# Patient Record
Sex: MALE | Race: WHITE | NOT HISPANIC OR LATINO
[De-identification: names, ages, dates, MRNs, and addresses within clinical notes are randomized per-mention and may not be internally consistent; named-entity substitution may affect disease eponyms.]

---

## 2018-11-21 PROBLEM — Z00.00 ENCOUNTER FOR PREVENTIVE HEALTH EXAMINATION: Status: ACTIVE | Noted: 2018-11-21

## 2018-12-06 ENCOUNTER — APPOINTMENT (OUTPATIENT)
Dept: HEART AND VASCULAR | Facility: CLINIC | Age: 63
End: 2018-12-06

## 2018-12-13 ENCOUNTER — APPOINTMENT (OUTPATIENT)
Dept: HEART AND VASCULAR | Facility: CLINIC | Age: 63
End: 2018-12-13

## 2018-12-26 ENCOUNTER — APPOINTMENT (OUTPATIENT)
Dept: HEART AND VASCULAR | Facility: CLINIC | Age: 63
End: 2018-12-26

## 2021-12-22 ENCOUNTER — EMERGENCY (EMERGENCY)
Facility: HOSPITAL | Age: 66
LOS: 0 days | Discharge: HOME | End: 2021-12-22
Attending: EMERGENCY MEDICINE | Admitting: EMERGENCY MEDICINE
Payer: MEDICARE

## 2021-12-22 VITALS
OXYGEN SATURATION: 99 % | SYSTOLIC BLOOD PRESSURE: 139 MMHG | HEART RATE: 66 BPM | TEMPERATURE: 98 F | DIASTOLIC BLOOD PRESSURE: 67 MMHG | RESPIRATION RATE: 18 BRPM

## 2021-12-22 DIAGNOSIS — R05.1 ACUTE COUGH: ICD-10-CM

## 2021-12-22 DIAGNOSIS — R09.81 NASAL CONGESTION: ICD-10-CM

## 2021-12-22 DIAGNOSIS — I10 ESSENTIAL (PRIMARY) HYPERTENSION: ICD-10-CM

## 2021-12-22 DIAGNOSIS — U07.1 COVID-19: ICD-10-CM

## 2021-12-22 DIAGNOSIS — R68.83 CHILLS (WITHOUT FEVER): ICD-10-CM

## 2021-12-22 LAB
FLUAV AG NPH QL: NEGATIVE — SIGNIFICANT CHANGE UP
FLUBV AG NPH QL: NEGATIVE — SIGNIFICANT CHANGE UP
RSV RNA NPH QL NAA+NON-PROBE: NEGATIVE — SIGNIFICANT CHANGE UP
SARS-COV-2 RNA SPEC QL NAA+PROBE: POSITIVE

## 2021-12-22 PROCEDURE — 99284 EMERGENCY DEPT VISIT MOD MDM: CPT

## 2021-12-22 NOTE — ED PROVIDER NOTE - NS ED MD DISPO DISCHARGE
Scheduled with Sirena tomorrow.     All other review of systems negative, except as noted in HPI Home

## 2021-12-22 NOTE — ED PROVIDER NOTE - PATIENT PORTAL LINK FT
You can access the FollowMyHealth Patient Portal offered by [facility] by registering at the following website: [email or web address]. By joining [facility]’s FollowMyHealth portal, you will also be able to view your health information using other applications (apps) compatible with our system.

## 2021-12-22 NOTE — ED PROVIDER NOTE - PHYSICAL EXAMINATION
Vital Signs: I have reviewed the initial vital signs.  Constitutional: well-nourished, appears stated age, no acute distress  Head: atraumatic and normocephalic  Eyes:PERRLA, EOMI, no nystagmus, clear conjunctiva  ENT: TM b/l clear, oropharynx clear, no dental injury, MMM  Cardiovascular: regular rate, regular rhythm, well-perfused extremities  Respiratory: unlabored respiratory effort, clear to auscultation bilaterally  Gastrointestinal: soft, non-tender abdomen, no pulsatile mass  Neuro: awake, alert, follows commands, oriented, no focal deficits, GCS 15  ;

## 2021-12-22 NOTE — ED ADULT NURSE NOTE - PRIMARY CARE PROVIDER
pmd Doxycycline Counseling:  Patient counseled regarding possible photosensitivity and increased risk for sunburn.  Patient instructed to avoid sunlight, if possible.  When exposed to sunlight, patients should wear protective clothing, sunglasses, and sunscreen.  The patient was instructed to call the office immediately if the following severe adverse effects occur:  hearing changes, easy bruising/bleeding, severe headache, or vision changes.  The patient verbalized understanding of the proper use and possible adverse effects of doxycycline.  All of the patient's questions and concerns were addressed.

## 2021-12-22 NOTE — ED ADULT NURSE NOTE - NSIMPLEMENTINTERV_GEN_ALL_ED
Implemented All Universal Safety Interventions:  [place] to call system. Call bell, personal items and telephone within reach. Instruct patient to call for assistance. Room bathroom lighting operational. Non-slip footwear when patient is off stretcher. Physically safe environment: no spills, clutter or unnecessary equipment. Stretcher in lowest position, wheels locked, appropriate side rails in place.

## 2021-12-22 NOTE — ED PROVIDER NOTE - OBJECTIVE STATEMENT
67 y/o male presents to the ED with chills, non productive cough and sore throat x 2 days. patient covid vaccinated not boostered. patient attended a large wedding over the weekend. patient denies any vomiting or diarrhea. no dysuria or gross hematuria. no myalgias

## 2021-12-22 NOTE — ED PROVIDER NOTE - CLINICAL SUMMARY MEDICAL DECISION MAKING FREE TEXT BOX
pt presenting with URI sx x2d. no cp, sob. Well appearing, NAD, non toxic. NCAT PERRLA EOMI neck supple non tender normal wob cta bl rrr abdomen s nt nd no rebound no guarding WWPx4 neuro non focal. Comfortable with discharge and follow-up outpatient, strict return precautions given. Endorses understanding of all of this and aware that they can return at any time for new or concerning symptoms. No further questions or concerns at this time

## 2021-12-22 NOTE — ED PROVIDER NOTE - NS ED ROS FT
Review of Systems    Constitutional: (-) fever or chills  respiratory: (-) cough (-) shortness of breath  Cardiovascular: (-) syncope, palpitations or chest pain  GI: (-) no abdominal pain, vomiting or diarrhea  Integumentary: (-) rash or painful lymph nodes  msk: no joint pain or painful ROM  ent: sore throat  Neurological: (-) headache or head injury

## 2023-02-23 ENCOUNTER — APPOINTMENT (OUTPATIENT)
Dept: ELECTROPHYSIOLOGY | Facility: CLINIC | Age: 68
End: 2023-02-23

## 2023-03-09 ENCOUNTER — APPOINTMENT (OUTPATIENT)
Dept: ELECTROPHYSIOLOGY | Facility: CLINIC | Age: 68
End: 2023-03-09

## 2023-06-09 ENCOUNTER — INPATIENT (INPATIENT)
Facility: HOSPITAL | Age: 68
LOS: 0 days | Discharge: HOME | DRG: 309 | End: 2023-06-09
Attending: INTERNAL MEDICINE | Admitting: HOSPITALIST
Payer: MEDICARE

## 2023-06-09 VITALS
DIASTOLIC BLOOD PRESSURE: 86 MMHG | SYSTOLIC BLOOD PRESSURE: 157 MMHG | RESPIRATION RATE: 18 BRPM | TEMPERATURE: 98 F | OXYGEN SATURATION: 96 % | HEART RATE: 41 BPM

## 2023-06-09 VITALS
HEART RATE: 45 BPM | SYSTOLIC BLOOD PRESSURE: 203 MMHG | RESPIRATION RATE: 19 BRPM | DIASTOLIC BLOOD PRESSURE: 97 MMHG | WEIGHT: 205.03 LBS | TEMPERATURE: 98 F | OXYGEN SATURATION: 96 %

## 2023-06-09 DIAGNOSIS — R00.1 BRADYCARDIA, UNSPECIFIED: ICD-10-CM

## 2023-06-09 LAB
ALBUMIN SERPL ELPH-MCNC: 4.6 G/DL — SIGNIFICANT CHANGE UP (ref 3.5–5.2)
ALP SERPL-CCNC: 62 U/L — SIGNIFICANT CHANGE UP (ref 30–115)
ALT FLD-CCNC: 30 U/L — SIGNIFICANT CHANGE UP (ref 0–41)
ANION GAP SERPL CALC-SCNC: 10 MMOL/L — SIGNIFICANT CHANGE UP (ref 7–14)
AST SERPL-CCNC: 23 U/L — SIGNIFICANT CHANGE UP (ref 0–41)
BASOPHILS # BLD AUTO: 0.07 K/UL — SIGNIFICANT CHANGE UP (ref 0–0.2)
BASOPHILS NFR BLD AUTO: 0.8 % — SIGNIFICANT CHANGE UP (ref 0–1)
BILIRUB SERPL-MCNC: 0.3 MG/DL — SIGNIFICANT CHANGE UP (ref 0.2–1.2)
BUN SERPL-MCNC: 18 MG/DL — SIGNIFICANT CHANGE UP (ref 10–20)
CALCIUM SERPL-MCNC: 9.7 MG/DL — SIGNIFICANT CHANGE UP (ref 8.4–10.5)
CHLORIDE SERPL-SCNC: 99 MMOL/L — SIGNIFICANT CHANGE UP (ref 98–110)
CO2 SERPL-SCNC: 29 MMOL/L — SIGNIFICANT CHANGE UP (ref 17–32)
CREAT SERPL-MCNC: 0.8 MG/DL — SIGNIFICANT CHANGE UP (ref 0.7–1.5)
EGFR: 96 ML/MIN/1.73M2 — SIGNIFICANT CHANGE UP
EOSINOPHIL # BLD AUTO: 0.68 K/UL — SIGNIFICANT CHANGE UP (ref 0–0.7)
EOSINOPHIL NFR BLD AUTO: 8.2 % — HIGH (ref 0–8)
GLUCOSE BLDC GLUCOMTR-MCNC: 122 MG/DL — HIGH (ref 70–99)
GLUCOSE BLDC GLUCOMTR-MCNC: 141 MG/DL — HIGH (ref 70–99)
GLUCOSE SERPL-MCNC: 158 MG/DL — HIGH (ref 70–99)
HCT VFR BLD CALC: 44 % — SIGNIFICANT CHANGE UP (ref 42–52)
HGB BLD-MCNC: 14.6 G/DL — SIGNIFICANT CHANGE UP (ref 14–18)
IMM GRANULOCYTES NFR BLD AUTO: 0.2 % — SIGNIFICANT CHANGE UP (ref 0.1–0.3)
LACTATE SERPL-SCNC: 1.2 MMOL/L — SIGNIFICANT CHANGE UP (ref 0.7–2)
LYMPHOCYTES # BLD AUTO: 2.19 K/UL — SIGNIFICANT CHANGE UP (ref 1.2–3.4)
LYMPHOCYTES # BLD AUTO: 26.5 % — SIGNIFICANT CHANGE UP (ref 20.5–51.1)
MCHC RBC-ENTMCNC: 29.2 PG — SIGNIFICANT CHANGE UP (ref 27–31)
MCHC RBC-ENTMCNC: 33.2 G/DL — SIGNIFICANT CHANGE UP (ref 32–37)
MCV RBC AUTO: 88 FL — SIGNIFICANT CHANGE UP (ref 80–94)
MONOCYTES # BLD AUTO: 1.06 K/UL — HIGH (ref 0.1–0.6)
MONOCYTES NFR BLD AUTO: 12.8 % — HIGH (ref 1.7–9.3)
NEUTROPHILS # BLD AUTO: 4.25 K/UL — SIGNIFICANT CHANGE UP (ref 1.4–6.5)
NEUTROPHILS NFR BLD AUTO: 51.5 % — SIGNIFICANT CHANGE UP (ref 42.2–75.2)
NRBC # BLD: 0 /100 WBCS — SIGNIFICANT CHANGE UP (ref 0–0)
NT-PROBNP SERPL-SCNC: 360 PG/ML — HIGH (ref 0–300)
PLATELET # BLD AUTO: 221 K/UL — SIGNIFICANT CHANGE UP (ref 130–400)
PMV BLD: 10.5 FL — HIGH (ref 7.4–10.4)
POTASSIUM SERPL-MCNC: 4.9 MMOL/L — SIGNIFICANT CHANGE UP (ref 3.5–5)
POTASSIUM SERPL-SCNC: 4.9 MMOL/L — SIGNIFICANT CHANGE UP (ref 3.5–5)
PROT SERPL-MCNC: 7.7 G/DL — SIGNIFICANT CHANGE UP (ref 6–8)
RBC # BLD: 5 M/UL — SIGNIFICANT CHANGE UP (ref 4.7–6.1)
RBC # FLD: 13.7 % — SIGNIFICANT CHANGE UP (ref 11.5–14.5)
SODIUM SERPL-SCNC: 138 MMOL/L — SIGNIFICANT CHANGE UP (ref 135–146)
TROPONIN T SERPL-MCNC: <0.01 NG/ML — SIGNIFICANT CHANGE UP
TROPONIN T SERPL-MCNC: <0.01 NG/ML — SIGNIFICANT CHANGE UP
TSH SERPL-MCNC: 3.24 UIU/ML — SIGNIFICANT CHANGE UP (ref 0.27–4.2)
WBC # BLD: 8.27 K/UL — SIGNIFICANT CHANGE UP (ref 4.8–10.8)
WBC # FLD AUTO: 8.27 K/UL — SIGNIFICANT CHANGE UP (ref 4.8–10.8)

## 2023-06-09 PROCEDURE — 99223 1ST HOSP IP/OBS HIGH 75: CPT

## 2023-06-09 PROCEDURE — 93306 TTE W/DOPPLER COMPLETE: CPT | Mod: 26

## 2023-06-09 PROCEDURE — 84443 ASSAY THYROID STIM HORMONE: CPT

## 2023-06-09 PROCEDURE — 71045 X-RAY EXAM CHEST 1 VIEW: CPT | Mod: 26

## 2023-06-09 PROCEDURE — 82962 GLUCOSE BLOOD TEST: CPT

## 2023-06-09 PROCEDURE — 36415 COLL VENOUS BLD VENIPUNCTURE: CPT

## 2023-06-09 PROCEDURE — 83605 ASSAY OF LACTIC ACID: CPT

## 2023-06-09 PROCEDURE — 93306 TTE W/DOPPLER COMPLETE: CPT

## 2023-06-09 PROCEDURE — 93010 ELECTROCARDIOGRAM REPORT: CPT | Mod: 77

## 2023-06-09 PROCEDURE — 99222 1ST HOSP IP/OBS MODERATE 55: CPT

## 2023-06-09 PROCEDURE — 84484 ASSAY OF TROPONIN QUANT: CPT

## 2023-06-09 PROCEDURE — 99285 EMERGENCY DEPT VISIT HI MDM: CPT

## 2023-06-09 PROCEDURE — 93010 ELECTROCARDIOGRAM REPORT: CPT

## 2023-06-09 RX ORDER — SODIUM CHLORIDE 9 MG/ML
1000 INJECTION, SOLUTION INTRAVENOUS
Refills: 0 | Status: DISCONTINUED | OUTPATIENT
Start: 2023-06-09 | End: 2023-06-09

## 2023-06-09 RX ORDER — AMLODIPINE BESYLATE 2.5 MG/1
10 TABLET ORAL DAILY
Refills: 0 | Status: DISCONTINUED | OUTPATIENT
Start: 2023-06-09 | End: 2023-06-09

## 2023-06-09 RX ORDER — RANOLAZINE 500 MG/1
500 TABLET, FILM COATED, EXTENDED RELEASE ORAL
Refills: 0 | Status: DISCONTINUED | OUTPATIENT
Start: 2023-06-09 | End: 2023-06-09

## 2023-06-09 RX ORDER — DEXTROSE 50 % IN WATER 50 %
25 SYRINGE (ML) INTRAVENOUS ONCE
Refills: 0 | Status: DISCONTINUED | OUTPATIENT
Start: 2023-06-09 | End: 2023-06-09

## 2023-06-09 RX ORDER — INSULIN LISPRO 100/ML
VIAL (ML) SUBCUTANEOUS
Refills: 0 | Status: DISCONTINUED | OUTPATIENT
Start: 2023-06-09 | End: 2023-06-09

## 2023-06-09 RX ORDER — ATORVASTATIN CALCIUM 80 MG/1
80 TABLET, FILM COATED ORAL AT BEDTIME
Refills: 0 | Status: DISCONTINUED | OUTPATIENT
Start: 2023-06-09 | End: 2023-06-09

## 2023-06-09 RX ORDER — ASPIRIN/CALCIUM CARB/MAGNESIUM 324 MG
81 TABLET ORAL DAILY
Refills: 0 | Status: DISCONTINUED | OUTPATIENT
Start: 2023-06-09 | End: 2023-06-09

## 2023-06-09 RX ORDER — ACETAMINOPHEN 500 MG
650 TABLET ORAL EVERY 6 HOURS
Refills: 0 | Status: DISCONTINUED | OUTPATIENT
Start: 2023-06-09 | End: 2023-06-09

## 2023-06-09 RX ORDER — LANOLIN ALCOHOL/MO/W.PET/CERES
3 CREAM (GRAM) TOPICAL AT BEDTIME
Refills: 0 | Status: DISCONTINUED | OUTPATIENT
Start: 2023-06-09 | End: 2023-06-09

## 2023-06-09 RX ORDER — LOSARTAN POTASSIUM 100 MG/1
100 TABLET, FILM COATED ORAL DAILY
Refills: 0 | Status: DISCONTINUED | OUTPATIENT
Start: 2023-06-09 | End: 2023-06-09

## 2023-06-09 RX ORDER — ATROPINE SULFATE 0.1 MG/ML
1 SYRINGE (ML) INJECTION ONCE
Refills: 0 | Status: DISCONTINUED | OUTPATIENT
Start: 2023-06-09 | End: 2023-06-09

## 2023-06-09 RX ORDER — DEXTROSE 50 % IN WATER 50 %
15 SYRINGE (ML) INTRAVENOUS ONCE
Refills: 0 | Status: DISCONTINUED | OUTPATIENT
Start: 2023-06-09 | End: 2023-06-09

## 2023-06-09 RX ORDER — DEXTROSE 50 % IN WATER 50 %
12.5 SYRINGE (ML) INTRAVENOUS ONCE
Refills: 0 | Status: DISCONTINUED | OUTPATIENT
Start: 2023-06-09 | End: 2023-06-09

## 2023-06-09 RX ORDER — ONDANSETRON 8 MG/1
4 TABLET, FILM COATED ORAL EVERY 8 HOURS
Refills: 0 | Status: DISCONTINUED | OUTPATIENT
Start: 2023-06-09 | End: 2023-06-09

## 2023-06-09 RX ORDER — ENOXAPARIN SODIUM 100 MG/ML
40 INJECTION SUBCUTANEOUS EVERY 24 HOURS
Refills: 0 | Status: DISCONTINUED | OUTPATIENT
Start: 2023-06-09 | End: 2023-06-09

## 2023-06-09 RX ORDER — HYDRALAZINE HCL 50 MG
25 TABLET ORAL DAILY
Refills: 0 | Status: DISCONTINUED | OUTPATIENT
Start: 2023-06-09 | End: 2023-06-09

## 2023-06-09 RX ORDER — PANTOPRAZOLE SODIUM 20 MG/1
40 TABLET, DELAYED RELEASE ORAL
Refills: 0 | Status: DISCONTINUED | OUTPATIENT
Start: 2023-06-09 | End: 2023-06-09

## 2023-06-09 RX ORDER — ASPIRIN/CALCIUM CARB/MAGNESIUM 324 MG
324 TABLET ORAL ONCE
Refills: 0 | Status: COMPLETED | OUTPATIENT
Start: 2023-06-09 | End: 2023-06-09

## 2023-06-09 RX ORDER — GLUCAGON INJECTION, SOLUTION 0.5 MG/.1ML
1 INJECTION, SOLUTION SUBCUTANEOUS ONCE
Refills: 0 | Status: DISCONTINUED | OUTPATIENT
Start: 2023-06-09 | End: 2023-06-09

## 2023-06-09 RX ADMIN — Medication 81 MILLIGRAM(S): at 15:03

## 2023-06-09 RX ADMIN — Medication 324 MILLIGRAM(S): at 01:09

## 2023-06-09 RX ADMIN — ENOXAPARIN SODIUM 40 MILLIGRAM(S): 100 INJECTION SUBCUTANEOUS at 06:02

## 2023-06-09 RX ADMIN — PANTOPRAZOLE SODIUM 40 MILLIGRAM(S): 20 TABLET, DELAYED RELEASE ORAL at 06:02

## 2023-06-09 NOTE — CONSULT NOTE ADULT - SUBJECTIVE AND OBJECTIVE BOX
Outpt cardiologist:    Reason for Consult:     HISTORY OF PRESENT ILLNESS:  69 yo M with PMHx significant for HTN, HLD, DM, CAD s/p CABG (f/u with Dr. Ced Neff) presented for chest pain.  He felt overnight while working on the computer chest pain crushing that radiates to his L arm for a period of time of 1 hours approximately and decided to come to the ED. He had associated lightheadedness and BP at home 208 SBP which is not normal. He reports his chest pain is similar to when he had CABG but is lighter. He did not had any sweating, palpitations at time of event.  He does not have any interval chest pain, AVINA, palpitations - He denies any history of arrythmia or CHF  He f/up with Dr. Neff and had a recent echo done not long ago - is compliant with his medications (does no remeber exactly which one he takes)     Med rec incomplete - He doesnt have a med list and his pharmacy is [facility] - Last time he took his meds including metop succ 50 was yesterday 6:00 AM     ROS   Positive as above  Negative fever, chills, nausea, vomiting, abdominal pain, sob, cough, constipation, diarrhea, incontinence    ED Vital Signs Last 24 Hrs  T(C): 36.7 (09 Jun 2023 00:19), Max: 36.7 (09 Jun 2023 00:19)  T(F): 98.1 (09 Jun 2023 00:19), Max: 98.1 (09 Jun 2023 00:19)  HR: 30 (09 Jun 2023 03:19) (30 - 51)  BP: 166/77 (09 Jun 2023 03:19) (166/77 - 203/97)  RR: 18 (09 Jun 2023 03:19) (18 - 19)  SpO2: 96% (09 Jun 2023 03:19) (96% - 98%)  O2 Parameters below as of 09 Jun 2023 03:19  Patient On (Oxygen Delivery Method): room air    Labs significant for trops neg -    EKG Deep Q waves in inferior leads - TWI in III, avF - Atrial fibrillation with SVR   CXR mild pulmonary vascular congestion otherwise no acute cardiopulm abn   In the ED given aspirin 324   Cardiology fellow made aware after examining patient          (09 Jun 2023 03:44)      Cardiology Fellow Notes    Patient has CABG 20 years has been okay since follows with Dr. Neff   Yesterday had an episode of left sided chest pressure with left arm numbness that lasted 1h so he presented to the ED     In the ED he was initially very HTN and bradycardic   ECG showed RBBB with sinus bradycardia and 1st degree AV block     On my interview, the patient was asymtomatic, looked comfortable   HR was 20s 30s when asleep consistent with sinus bradycardia with blocker PACs     2 sets of troponin were negative         PAST MEDICAL & SURGICAL HISTORY  Mild HTN        FAMILY HISTORY:  FAMILY HISTORY:      SOCIAL HISTORY:  Social History:      ALLERGIES:  No Known Allergies      MEDICATIONS:  aspirin  chewable 81 milliGRAM(s) Oral daily  atorvastatin 80 milliGRAM(s) Oral at bedtime  dextrose 5%. 1000 milliLiter(s) (100 mL/Hr) IV Continuous <Continuous>  dextrose 5%. 1000 milliLiter(s) (50 mL/Hr) IV Continuous <Continuous>  dextrose 50% Injectable 12.5 Gram(s) IV Push once  dextrose 50% Injectable 25 Gram(s) IV Push once  dextrose 50% Injectable 25 Gram(s) IV Push once  enoxaparin Injectable 40 milliGRAM(s) SubCutaneous every 24 hours  glucagon  Injectable 1 milliGRAM(s) IntraMuscular once  insulin lispro (ADMELOG) corrective regimen sliding scale   SubCutaneous three times a day before meals  pantoprazole    Tablet 40 milliGRAM(s) Oral before breakfast    PRN:  acetaminophen     Tablet .. 650 milliGRAM(s) Oral every 6 hours PRN  aluminum hydroxide/magnesium hydroxide/simethicone Suspension 30 milliLiter(s) Oral every 4 hours PRN  atropine Injectable 1 milliGRAM(s) IntraMuscular once PRN  dextrose Oral Gel 15 Gram(s) Oral once PRN  melatonin 3 milliGRAM(s) Oral at bedtime PRN  ondansetron Injectable 4 milliGRAM(s) IV Push every 8 hours PRN      HOME MEDICATIONS:  Home Medications:      VITALS:   T(F): 98.1 (06-09 @ 00:19), Max: 98.1 (06-09 @ 00:19)  HR: 38 (06-09 @ 05:39) (30 - 51)  BP: 151/68 (06-09 @ 05:39) (151/68 - 203/97)  BP(mean): --  RR: 18 (06-09 @ 05:39) (18 - 19)  SpO2: 96% (06-09 @ 05:39) (96% - 98%)    I&O's Summary      REVIEW OF SYSTEMS:  CONSTITUTIONAL: No weakness, fevers or chills  HEENT: No visual changes, neck/ear pain  RESPIRATORY: No cough, sob  CARDIOVASCULAR: See HPI  GASTROINTESTINAL: No abdominal pain. No nausea, vomiting, diarrhea   GENITOURINARY: No dysuria, frequency or hematuria  NEUROLOGICAL: No new focal deficits  SKIN: No new rashes    PHYSICAL EXAM:  General: Not in distress.  Non-toxic appearing.   HEENT: EOMI  Cardio: regular, S1, S2, no murmur  Pulm: B/L BS.  No wheezing / crackles / rales  Abdomen: Soft, non-tender, non-distended. Normoactive bowel sounds  Extremities: No edema b/l le  Neuro: A&O x3. No focal deficits    LABS:                        14.6   8.27  )-----------( 221      ( 09 Jun 2023 00:45 )             44.0     06-09    138  |  99  |  18  ----------------------------<  158<H>  4.9   |  29  |  0.8    Ca    9.7      09 Jun 2023 00:45    TPro  7.7  /  Alb  4.6  /  TBili  0.3  /  DBili  x   /  AST  23  /  ALT  30  /  AlkPhos  62  06-09      Troponin T, Serum: <0.01 ng/mL (06-09-23 @ 04:40)  Lactate, Blood: 1.2 mmol/L (06-09-23 @ 04:40)  Troponin T, Serum: <0.01 ng/mL (06-09-23 @ 00:45)    CARDIAC MARKERS ( 09 Jun 2023 04:40 )  x     / <0.01 ng/mL / x     / x     / x      CARDIAC MARKERS ( 09 Jun 2023 00:45 )  x     / <0.01 ng/mL / x     / x     / x            Troponin trend:  ECG:    Sinus bradycardia with 1st degree av block and blocked PACs      Outpt cardiologist:    Reason for Consult:     HISTORY OF PRESENT ILLNESS:  69 yo M with PMHx significant for HTN, HLD, DM, CAD s/p CABG (f/u with Dr. Ced Neff) presented for chest pain.  He felt overnight while working on the computer chest pain crushing that radiates to his L arm for a period of time of 1 hours approximately and decided to come to the ED. He had associated lightheadedness and BP at home 208 SBP which is not normal. He reports his chest pain is similar to when he had CABG but is lighter. He did not had any sweating, palpitations at time of event.  He does not have any interval chest pain, AVINA, palpitations - He denies any history of arrythmia or CHF  He f/up with Dr. Neff and had a recent echo done not long ago - is compliant with his medications (does no remember exactly which one he takes)     Med rec incomplete - He doesnt have a med list and his pharmacy is [facility] - Last time he took his meds including metop succ 50 was yesterday 6:00 AM     ROS   Positive as above  Negative fever, chills, nausea, vomiting, abdominal pain, sob, cough, constipation, diarrhea, incontinence    ED Vital Signs Last 24 Hrs  T(C): 36.7 (09 Jun 2023 00:19), Max: 36.7 (09 Jun 2023 00:19)  T(F): 98.1 (09 Jun 2023 00:19), Max: 98.1 (09 Jun 2023 00:19)  HR: 30 (09 Jun 2023 03:19) (30 - 51)  BP: 166/77 (09 Jun 2023 03:19) (166/77 - 203/97)  RR: 18 (09 Jun 2023 03:19) (18 - 19)  SpO2: 96% (09 Jun 2023 03:19) (96% - 98%)  O2 Parameters below as of 09 Jun 2023 03:19  Patient On (Oxygen Delivery Method): room air    Labs significant for trops neg -    EKG Deep Q waves in inferior leads - TWI in III, avF - Atrial fibrillation with SVR   CXR mild pulmonary vascular congestion otherwise no acute cardiopulm abn   In the ED given aspirin 324   Cardiology fellow made aware after examining patient          (09 Jun 2023 03:44)      Cardiology Fellow Notes    Patient has CABG 20 years has been okay since follows with Dr. Neff   Yesterday had an episode of left sided chest pressure with left arm numbness that lasted 1h so he presented to the ED     In the ED he was initially very HTN and bradycardic   ECG showed RBBB with sinus bradycardia and 1st degree AV block     On my interview, the patient was asymtomatic, looked comfortable   HR was 20s 30s when asleep consistent with sinus bradycardia with blocked PACs     2 sets of troponin were negative         PAST MEDICAL & SURGICAL HISTORY  Mild HTN        FAMILY HISTORY:  FAMILY HISTORY: no family history of premature CAD or SCD      SOCIAL HISTORY:  Social History: no toxic habits      ALLERGIES:  No Known Allergies      MEDICATIONS:  aspirin  chewable 81 milliGRAM(s) Oral daily  atorvastatin 80 milliGRAM(s) Oral at bedtime  dextrose 5%. 1000 milliLiter(s) (100 mL/Hr) IV Continuous <Continuous>  dextrose 5%. 1000 milliLiter(s) (50 mL/Hr) IV Continuous <Continuous>  dextrose 50% Injectable 12.5 Gram(s) IV Push once  dextrose 50% Injectable 25 Gram(s) IV Push once  dextrose 50% Injectable 25 Gram(s) IV Push once  enoxaparin Injectable 40 milliGRAM(s) SubCutaneous every 24 hours  glucagon  Injectable 1 milliGRAM(s) IntraMuscular once  insulin lispro (ADMELOG) corrective regimen sliding scale   SubCutaneous three times a day before meals  pantoprazole    Tablet 40 milliGRAM(s) Oral before breakfast    PRN:  acetaminophen     Tablet .. 650 milliGRAM(s) Oral every 6 hours PRN  aluminum hydroxide/magnesium hydroxide/simethicone Suspension 30 milliLiter(s) Oral every 4 hours PRN  atropine Injectable 1 milliGRAM(s) IntraMuscular once PRN  dextrose Oral Gel 15 Gram(s) Oral once PRN  melatonin 3 milliGRAM(s) Oral at bedtime PRN  ondansetron Injectable 4 milliGRAM(s) IV Push every 8 hours PRN      HOME MEDICATIONS:  Home Medications:      VITALS:   T(F): 98.1 (06-09 @ 00:19), Max: 98.1 (06-09 @ 00:19)  HR: 38 (06-09 @ 05:39) (30 - 51)  BP: 151/68 (06-09 @ 05:39) (151/68 - 203/97)  BP(mean): --  RR: 18 (06-09 @ 05:39) (18 - 19)  SpO2: 96% (06-09 @ 05:39) (96% - 98%)    I&O's Summary      REVIEW OF SYSTEMS:  CONSTITUTIONAL: No weakness, fevers or chills  HEENT: No visual changes, neck/ear pain  RESPIRATORY: No cough, sob  CARDIOVASCULAR: See HPI  GASTROINTESTINAL: No abdominal pain. No nausea, vomiting, diarrhea   GENITOURINARY: No dysuria, frequency or hematuria  NEUROLOGICAL: No new focal deficits  SKIN: No new rashes  10 point ROS completed; negative except as stated in HPI    PHYSICAL EXAM:  General: Not in distress.  Non-toxic appearing.   HEENT: EOMI, PERRLA  Neck: supple, no JVD  Cardio: bradycardic, regular, S1, S2, no murmur  Pulm: B/L BS.  No wheezing / crackles / rales  Abdomen: Soft, non-tender, non-distended. Normoactive bowel sounds  Extremities: No edema b/l le  Neuro: A&O x3. No focal deficits    LABS:                        14.6   8.27  )-----------( 221      ( 09 Jun 2023 00:45 )             44.0     06-09    138  |  99  |  18  ----------------------------<  158<H>  4.9   |  29  |  0.8    Ca    9.7      09 Jun 2023 00:45    TPro  7.7  /  Alb  4.6  /  TBili  0.3  /  DBili  x   /  AST  23  /  ALT  30  /  AlkPhos  62  06-09      Troponin T, Serum: <0.01 ng/mL (06-09-23 @ 04:40)  Lactate, Blood: 1.2 mmol/L (06-09-23 @ 04:40)  Troponin T, Serum: <0.01 ng/mL (06-09-23 @ 00:45)    CARDIAC MARKERS ( 09 Jun 2023 04:40 )  x     / <0.01 ng/mL / x     / x     / x      CARDIAC MARKERS ( 09 Jun 2023 00:45 )  x     / <0.01 ng/mL / x     / x     / x            Troponin trend:  ECG:    Sinus bradycardia with 1st degree av block and blocked PACs

## 2023-06-09 NOTE — ED PROVIDER NOTE - PHYSICAL EXAMINATION
CONSTITUTIONAL: NAD  SKIN: Warm dry  HEAD: NCAT  EYES: NL inspection  ENT: MMM  NECK: Supple; non tender.  CARD: Bradycardic  RESP: CTAB  ABD: S/NT no R/G  EXT: no pedal edema  NEURO: Grossly unremarkable  PSYCH: Cooperative, appropriate.

## 2023-06-09 NOTE — DISCHARGE NOTE NURSING/CASE MANAGEMENT/SOCIAL WORK - NSDCPEFALRISK_GEN_ALL_CORE
For information on Fall & Injury Prevention, visit: [email or web address] OR  [email or web address] OR  https://www.cdc.gov/steadi/patient.html

## 2023-06-09 NOTE — ED PROVIDER NOTE - CARE PLAN
1 Principal Discharge DX:	Sepsis   Principal Discharge DX:	Symptomatic bradycardia  Secondary Diagnosis:	Chest pain

## 2023-06-09 NOTE — H&P ADULT - NSHPLABSRESULTS_GEN_ALL_CORE
14.6   8.27  )-----------( 221      ( 09 Jun 2023 00:45 )             44.0       06-09    138  |  99  |  18  ----------------------------<  158<H>  4.9   |  29  |  0.8    Ca    9.7      09 Jun 2023 00:45    TPro  7.7  /  Alb  4.6  /  TBili  0.3  /  DBili  x   /  AST  23  /  ALT  30  /  AlkPhos  62  06-09                      Lactate Trend      CARDIAC MARKERS ( 09 Jun 2023 00:45 )  x     / <0.01 ng/mL / x     / x     / x            CAPILLARY BLOOD GLUCOSE

## 2023-06-09 NOTE — ED ADULT NURSE REASSESSMENT NOTE - NS ED NURSE REASSESS COMMENT FT1
patient alert, oriented. denied any discomforts - noted with bradycardia. MD aware of the bradycardia, ordered atropine prn for symptomatic bradycardia. patient attached to heart monitor. to continue to monitor.

## 2023-06-09 NOTE — CONSULT NOTE ADULT - NS ATTEND AMEND GEN_ALL_CORE FT
Type 2 AV block (Wenckebach)- symptomatic (AVINA)  Chest pain    - Chest pain eval as per Cardio. Will likely need LHC  - Recommend PPM- can be done as OP, unless worsening of heart block seen on Tele.  - Hold BB for now

## 2023-06-09 NOTE — CONSULT NOTE ADULT - ATTENDING COMMENTS
Briefly, 68 year old man for whom cardiology is consulted for chest pain and bradycardia. Patient has two negative troponins. EKG and tele reviewed and he is in sinus bradycardia and Wenckebach. He is currently symptom free. Apparently, he had a stress test just a few months ago with his primary cardiologist, Dr. Neff. I have a call out to Dr. Neff. Depending on this stress test, the patient may need an inpatient catheterization. We will coordinate with EP and continue to update.   - Check echo  - Avoid all AV je blocking agents  - Continue aspirin and statin  - Replete K>4, Mg>2    We will follow with you.

## 2023-06-09 NOTE — CONSULT NOTE ADULT - ASSESSMENT
Impression   # Stable angina   # Sinus bradycardia with 1st degree AV block and blocked PACs     Recommendations   - Hold AV je blockers   - Continue aspirin   - TTE   - Keep NPO for stress test vs cath today   - EP evaluation   - Will need OP sleep study     This a preliminary plan. Will need to discuss with attending.   Signature: Kt GAYTAN, 1st year Cardiology Fellow   Impression   # Stable angina   # Sinus bradycardia with 1st degree AV block and blocked PACs     Recommendations   - Hold AV je blockers   - Continue aspirin   - TTE   - EP evaluation   - Based on TTE and EP evaluation will decide if patient needs cath   - Will need OP sleep study     Discussed with attending.   Signature: Kt GAYTAN, 1st year Cardiology Fellow     Impression   # Unstable angina   # Sinus bradycardia with 1st degree AV block and blocked PACs     Recommendations   - Hold AV je blockers   - Continue aspirin   - TTE   - EP evaluation   - Based on TTE and EP evaluation will decide if patient needs cath   - Will need OP sleep study     Discussed with attending.   Signature: Kt GAYTAN, 1st year Cardiology Fellow

## 2023-06-09 NOTE — ED PROVIDER NOTE - OBJECTIVE STATEMENT
68 y m, pmh of cad s/p bypass 20 years ago, htn, dm, pw cp, started 1 hour and a half ago, dull, intermittent, nonexertional, nonpleuritic, mild sob, no syncopal episode but endorses feeling mild light headedness. Denies abd pain, n/v, diaphoresis.

## 2023-06-09 NOTE — H&P ADULT - ASSESSMENT
Labs significant for trops neg -    EKG Deep Q waves in inferior leads - TWI in III, avF - Atrial fibrillation with SVR   CXR mild pulmonary vascular congestion otherwise no acute cardiopulm abn   In the ED given aspirin 324     Active Issues    #Cardiac chest pain   #Slow A fib     Chronic Issues     #DVT - lovenox sq     #GI - pantoprazole     #Diet - DASH    #Activity - IAT     #Code - Full code     #Disposition - IP  69 yo M with PMHx significant for HTN, HLD, DM, CAD s/p CABG (f/u with Dr. Ced Neff) presented for chest pain.    Labs significant for trops neg -    EKG Deep Q waves in inferior leads - TWI in III, avF - Atrial fibrillation with SVR   CXR mild pulmonary vascular congestion otherwise no acute cardiopulm abn   In the ED given aspirin 324   Cardiology fellow made aware after examining patient     Pharmacy [facility] in [place] - Needs med rec   Patient remembers taking: Aspirin 81 - Crestor - Metop succ 50 - Metformin - Glipizide - Jardiance -     Active Issues    #Cardiac chest pain   #Stable angina vs HTN urgency   #TWI/Q waves in inferior leads -  Acute vs Chronic ?  #Symptomatic Bradycardia with possible variable AVB  #Hx of CAD s/p CABG   No ACTIVE chest pain - Lightheadedness resolved   - Monitor for signs of chest pain lightheadedness  - Atropine at bedside  - Lactate STAT - if high consider starting Dopamine low dose   - SBP between 120 - 180 for possible compensatory rise - do not attempt to lower drastically unless ischemia r/o   - Serial CE/EKG   - TTE   - Telemetry    - Strict I/O    - Hold Metoprolol for now s  - f/u cardiology - will probably need cath   - Aspirin loaded - c/w 81 daily   - Atorvastatin 80     Chronic Issues     #HTN  #DM  -a1c/lipid profile/TSH   - hold home metoprolol 50 succinate  - sliding scale    - c/w home meds     #DVT - lovenox sq     #GI - pantoprazole     #Diet - DASH/CC - NPO for now     #Activity - IAT     #Code - Full code     #Disposition - IP  67 yo M with PMHx significant for HTN, HLD, DM, CAD s/p CABG (f/u with Dr. Ced Neff) presented for chest pain.    Labs significant for trops neg -    EKG Deep Q waves in inferior leads - TWI in III, avF - Atrial fibrillation with SVR   CXR mild pulmonary vascular congestion otherwise no acute cardiopulm abn   In the ED given aspirin 324   Cardiology fellow made aware after examining patient     Pharmacy [facility] in [place] - Needs med rec   Patient remembers taking: Aspirin 81 - Crestor - Metop succ 50 - Metformin - Glipizide - Jardiance -     Active Issues    #Cardiac chest pain   #Stable angina vs HTN urgency   #TWI/Q waves in inferior leads -  Acute vs Chronic ?  #Symptomatic Bradycardia with possible variable AVB likely 2/2 ischemia >> metoprolol induced  #Hx of CAD s/p CABG   No ACTIVE chest pain - Lightheadedness resolved   - Monitor for signs of chest pain lightheadedness  - Atropine at bedside  - Lactate STAT - if high consider starting Dopamine low dose   - SBP between 120 - 180 for possible compensatory rise - do not attempt to lower drastically unless ischemia r/o   - Serial CE/EKG   - TTE   - Telemetry    - Strict I/O    - Hold Metoprolol for now s  - f/u cardiology - will probably need cath   - Aspirin loaded - c/w 81 daily   - Atorvastatin 80     Chronic Issues     #HTN  #DM  -a1c/lipid profile/TSH   - hold home metoprolol 50 succinate  - sliding scale    - c/w home meds     #DVT - lovenox sq     #GI - pantoprazole     #Diet - DASH/CC - NPO for now     #Activity - IAT     #Code - Full code     #Disposition - IP

## 2023-06-09 NOTE — DISCHARGE NOTE NURSING/CASE MANAGEMENT/SOCIAL WORK - PATIENT PORTAL LINK FT
You can access the FollowMyHealth Patient Portal offered by [facility] by registering at the following website: [email or web address]. By joining [facility]’s FollowMyHealth portal, you will also be able to view your health information using other applications (apps) compatible with our system.

## 2023-06-09 NOTE — ED PROVIDER NOTE - WR ORDER DATE AND TIME 1
CONSTITUTIONAL: No weakness, fevers or chills  EYES/ENT: No visual changes;  No vertigo or throat pain   NECK: No pain or stiffness  RESPIRATORY: No cough, wheezing, hemoptysis; No shortness of breath  CARDIOVASCULAR: No chest pain or palpitations  GASTROINTESTINAL: No abdominal or epigastric pain. No nausea, vomiting, or hematemesis; No diarrhea or constipation. No melena or hematochezia.  GENITOURINARY: No dysuria, frequency or hematuria  NEUROLOGICAL: No numbness or weakness  SKIN: No itching, rashes
09-Jun-2023 00:37

## 2023-06-09 NOTE — ED PROVIDER NOTE - ATTENDING CONTRIBUTION TO CARE
68-year-old male with a past medical history significant for CAD status post CABG hypertension diabetes who presents with chest pain.  Patient states that he has been having chest pain on and off for the last couple of hours.  At assessment patient denies any active chest pain.  Patient denies any other medical complaints.    VITAL SIGNS: I have reviewed nursing notes and confirm.  CONSTITUTIONAL: non-toxic, well appearing  SKIN: no rash, no petechiae.  EYES: EOMI, pink conjunctiva, anicteric  ENT: tongue midline, no exudates, MMM  NECK: Supple; no meningismus, no JVD  CARD: RRR, no murmurs, equal radial pulses bilaterally 2+  RESP: CTAB, no respiratory distress  ABD: Soft, non-tender, non-distended, no peritoneal signs, no HSM, no CVA tenderness  EXT: Normal ROM x4. No edema. No calves tenderness  NEURO: Alert, oriented. CN2-12 intact, equal strength bilaterally, nl gait.  PSYCH: Cooperative, appropriate.      68-year-old male that presents with chest pain.  Labs EKG imaging consider admission for ACS evaluation.

## 2023-06-09 NOTE — CHART NOTE - NSCHARTNOTEFT_GEN_A_CORE
Writer alerted by RN Bert Carrasco that patient eloped.     Of note, writer had just spoken with patient, who upset about having to wait for his cardiac catheterization. Writer spoke with cath lab at [phone] and was advised that there were 2 patient ahead of him on the cath lab schedule. Patient's son (who was at bedside) requested to speak with the cardiology team. When this message was relayed to the cath lab, writer was told that a fellow would be asked to come and speak with the patient.

## 2023-06-09 NOTE — CONSULT NOTE ADULT - SUBJECTIVE AND OBJECTIVE BOX
Patient is a 68y old  Male who presents with a chief complaint of chest pain    HPI: 68 year old male with PMHx significant for HTN, HLD, DM, CAD s/p CABG (f/u with Dr. Ced Neff) presented for chest pain. He felt overnight while working on the computer chest pain crushing that radiates to his left arm for a period of time of 1 hours approximately and decided to come to the ED. He had associated lightheadedness and BP at home 208 SBP which is not normal. He reports his chest pain is similar to when he had CABG but is lighter. He did not had any sweating, palpitations at time of event. He does not have any interval chest pain, AVINA, palpitations - He denies any history of arrythmia or CHF, He follows with Dr. Neff and had a recent echo done not long ago - is compliant with his medications, Last time he took his meds including metop succ 50 was yesterday 6:00 AM. The patient was seen by cardiology. Electrophysiology was consulted due to bradycardia. The patient was seen and evaluated. He stated that he experienced chest pain at rest for which he presented. He is aware that his heart rates are slow from time to time but denies dizziness, loss of consciousness or syncope. The patient states that his last echocardiogram and stress test were performed recently at his out patient cardiologist office. Tele was reviewed revealing sinus rhythm with blocked atrial premature contraction as well as Wenckebach. Heart rates were as low 30s.      PAST MEDICAL & SURGICAL HISTORY:  Mild HTN    PREVIOUS DIAGNOSTIC TESTING:      ECHO  FINDINGS: None    STRESS  FINDINGS: None    CATHETERIZATION  FINDINGS: None    ELECTROPHYSIOLOGY STUDY  FINDINGS: None    MEDICATIONS  (STANDING):  aspirin  chewable 81 milliGRAM(s) Oral daily  atorvastatin 80 milliGRAM(s) Oral at bedtime  dextrose 5%. 1000 milliLiter(s) (100 mL/Hr) IV Continuous <Continuous>  dextrose 5%. 1000 milliLiter(s) (50 mL/Hr) IV Continuous <Continuous>  dextrose 50% Injectable 12.5 Gram(s) IV Push once  dextrose 50% Injectable 25 Gram(s) IV Push once  dextrose 50% Injectable 25 Gram(s) IV Push once  enoxaparin Injectable 40 milliGRAM(s) SubCutaneous every 24 hours  glucagon  Injectable 1 milliGRAM(s) IntraMuscular once  insulin lispro (ADMELOG) corrective regimen sliding scale   SubCutaneous three times a day before meals  pantoprazole    Tablet 40 milliGRAM(s) Oral before breakfast    MEDICATIONS  (PRN):  acetaminophen     Tablet .. 650 milliGRAM(s) Oral every 6 hours PRN Temp greater or equal to 38C (100.4F), Mild Pain (1 - 3)  aluminum hydroxide/magnesium hydroxide/simethicone Suspension 30 milliLiter(s) Oral every 4 hours PRN Dyspepsia  atropine Injectable 1 milliGRAM(s) IntraMuscular once PRN bradycardia sx  dextrose Oral Gel 15 Gram(s) Oral once PRN Blood Glucose LESS THAN 70 milliGRAM(s)/deciliter  melatonin 3 milliGRAM(s) Oral at bedtime PRN Insomnia  ondansetron Injectable 4 milliGRAM(s) IV Push every 8 hours PRN Nausea and/or Vomiting    FAMILY HISTORY: No history of sudden death    SOCIAL HISTORY: Lives at home    CIGARETTES: No    ALCOHOL: No    Past Surgical History: As above    Allergies: No Known Allergies    REVIEW OF SYSTEMS:  CONSTITUTIONAL: No fever, weight loss, chills, shakes, or fatigue  EYES: No eye pain, visual disturbances, or discharge  ENMT:  No difficulty hearing, tinnitus, vertigo; No sinus or throat pain  NECK: No pain or stiffness  BREASTS: No pain, masses, or nipple discharge  RESPIRATORY: No cough, wheezing, hemoptysis, or shortness of breath  CARDIOVASCULAR: (+) chest pain, No dyspnea, palpitations, dizziness, syncope, paroxysmal nocturnal dyspnea, orthopnea, or arm or leg swelling  GASTROINTESTINAL: No abdominal  or epigastric pain, nausea, vomiting, hematemesis, diarrhea, constipation, melena or bright red blood.  GENITOURINARY: No dysuria, nocturia, hematuria, or urinary incontinence  NEUROLOGICAL: No headaches, memory loss, slurred speech, limb weakness, loss of strength, numbness, or tremors  SKIN: No itching, burning, rashes, or lesions   LYMPH NODES: No enlarged glands  ENDOCRINE: No heat or cold intolerance, or hair loss  MUSCULOSKELETAL: No joint pain or swelling, muscle, back, or extremity pain  PSYCHIATRIC: No depression, anxiety, or difficulty sleeping  HEME/LYMPH: No easy bruising or bleeding gums  ALLERY AND IMMUNOLOGIC: No hives or rash.    Vital Signs Last 24 Hrs  T(C): 36.7 (09 Jun 2023 00:19), Max: 36.7 (09 Jun 2023 00:19)  T(F): 98.1 (09 Jun 2023 00:19), Max: 98.1 (09 Jun 2023 00:19)  HR: 38 (09 Jun 2023 06:41) (30 - 51)  BP: 148/69 (09 Jun 2023 06:41) (148/69 - 203/97)  BP(mean): --  RR: 18 (09 Jun 2023 06:41) (18 - 19)  SpO2: 97% (09 Jun 2023 06:41) (96% - 98%)    Parameters below as of 09 Jun 2023 06:41  Patient On (Oxygen Delivery Method): room air      PHYSICAL EXAM:  GENERAL: In no apparent distress, well nourished, and hydrated.  HEAD:  Atraumatic, Normocephalic  EYES: EOMI, PERRLA, conjunctiva and sclera clear  ENMT: No tonsillar erythema, exudates, or enlargements; ist mucous membranes, Good dentition, No lesions  NECK: Supple and normal thyroid.  No JVD or carotid bruit.  Carotid pulse is 2+ bilaterally.  HEART: Regular rate and rhythm; No murmurs, rubs, or gallops.  PULMONARY: Clear to auscultation and perfusion.  No rales, wheezing, or rhonchi bilaterally.  ABDOMEN: Soft, Nontender, Nondistended; Bowel sounds present  EXTREMITIES:  2+ Peripheral Pulses, No clubbing, cyanosis, or edema  LYMPH: No lymphadenopathy noted  NEUROLOGICAL: Grossly nonfocal      INTERPRETATION OF TELEMETRY: sinus rhythm with blocked atrial premature contractions, Wenckebach    ECG: Rhythm - Sinus bradycardia, 1st degree AVB, Rate - 45bpm,, AV Conduction- 300ms , Interventricular conduction - right bundle branch block, QRS duration - 160ms, Hypertrophy - Absent, Myocardial Infarction - Absent, QT interval - 480ms, WPW - Absent, Brugada pattern - Absent.     I&O's Detail    LABS:                        14.6   8.27  )-----------( 221      ( 09 Jun 2023 00:45 )             44.0     06-09    138  |  99  |  18  ----------------------------<  158<H>  4.9   |  29  |  0.8    Ca    9.7      09 Jun 2023 00:45    TPro  7.7  /  Alb  4.6  /  TBili  0.3  /  DBili  x   /  AST  23  /  ALT  30  /  AlkPhos  62  06-09    CARDIAC MARKERS ( 09 Jun 2023 04:40 )  x     / <0.01 ng/mL / x     / x     / x      CARDIAC MARKERS ( 09 Jun 2023 00:45 )  x     / <0.01 ng/mL / x     / x     / x        BNP  I&O's Detail    Daily     Daily     RADIOLOGY & ADDITIONAL STUDIES:

## 2023-06-09 NOTE — CONSULT NOTE ADULT - ASSESSMENT
68 year old male with PMHx significant for HTN, HLD, DM, CAD s/p CABG (f/u with Dr. Ced Neff) presented for chest pain. He felt overnight while working on the computer chest pain crushing that radiates to his left arm for a period of time of 1 hours approximately and decided to come to the ED. He had associated lightheadedness and BP at home 208 SBP which is not normal. He reports his chest pain is similar to when he had CABG but is lighter. He did not had any sweating, palpitations at time of event. He does not have any interval chest pain, AVINA, palpitations - He denies any history of arrythmia or CHF, He follows with Dr. Neff and had a recent echo done not long ago - is compliant with his medications, Last time he took his meds including metop succ 50 was yesterday 6:00 AM. The patient was seen by cardiology. Electrophysiology was consulted due to bradycardia. The patient was seen and evaluated. He stated that he experienced chest pain at rest for which he presented. He is aware that his heart rates are slow from time to time but denies dizziness, loss of consciousness or syncope. The patient states that his last echocardiogram and stress test were performed recently at his out patient cardiologist office. Tele was reviewed revealing sinus rhythm with blocked atrial premature contraction as well as Wenckebach. Heart rates were as low 30s.      The patient has conduction diease and will require beta blockers for the treatement of his coronary artery disease. Suggest:    Plan:   - Admit to tele  - Obtain out patient cardiac work up  - Giving his presentation of chest pain suggest an ischemic work up  - Obtain an echocardiogram  - Hold beta blockers  - Will continue to follow the patient and assess for the need of a permanent pacemaker. No current indication for a permanent pacemaker.

## 2023-06-09 NOTE — ED PROVIDER NOTE - PROGRESS NOTE DETAILS
SH   Pt is bradycardic and hypertensive 200 systolic. Pt is asymptomatic at this time.  will reassess

## 2023-06-09 NOTE — ED PROVIDER NOTE - CLINICAL SUMMARY MEDICAL DECISION MAKING FREE TEXT BOX
68-year-old male that presents with chest pain.  Labs EKG imaging consider admission for ACS evaluation. Labs and EKG were ordered and reviewed.  Imaging was ordered and reviewed by me.  Appropriate medications for patient's presenting complaints were ordered and effects were reassessed.  Patient's records (prior hospital, ED visit, and/or nursing home notes if available) were reviewed.  Additional history was obtained from EMS, family, and/or PCP (where available).  Escalation to admission/observation was considered.  Patient requires inpatient hospitalization - telemetry

## 2023-06-09 NOTE — ED ADULT NURSE NOTE - NSFALLUNIVINTERV_ED_ALL_ED
Bed/Stretcher in lowest position, wheels locked, appropriate side rails in place/Call bell, personal items and telephone in reach/Instruct patient to call for assistance before getting out of bed/chair/stretcher/Non-slip footwear applied when patient is off stretcher/[place] to call system/Physically safe environment - no spills, clutter or unnecessary equipment/Purposeful proactive rounding/Room/bathroom lighting operational, light cord in reach

## 2023-06-09 NOTE — H&P ADULT - NSHPPHYSICALEXAM_GEN_ALL_CORE
LOS:     VITALS:   T(C): 36.7 (06-09-23 @ 00:19), Max: 36.7 (06-09-23 @ 00:19)  HR: 30 (06-09-23 @ 03:19) (30 - 51)  BP: 166/77 (06-09-23 @ 03:19) (166/77 - 203/97)  RR: 18 (06-09-23 @ 03:19) (18 - 19)  SpO2: 96% (06-09-23 @ 03:19) (96% - 98%)    GENERAL: NAD, lying in bed comfortably  HEAD:  Atraumatic, Normocephalic  EYES: EOMI, PERRLA, conjunctiva and sclera clear  ENT: Moist mucous membranes  NECK: Supple, No JVD  CHEST/LUNG: Clear to auscultation bilaterally; No rales, rhonchi, wheezing, or rubs. Unlabored respirations  HEART: Regular rate and rhythm; No murmurs, rubs, or gallops  ABDOMEN: BSx4; Soft, nontender, nondistended  EXTREMITIES:  2+ Peripheral Pulses, brisk capillary refill. No clubbing, cyanosis, or edema  NERVOUS SYSTEM:  A&Ox3, no focal deficits   SKIN: No rashes or lesions LOS:     VITALS:   T(C): 36.7 (06-09-23 @ 00:19), Max: 36.7 (06-09-23 @ 00:19)  HR: 30 (06-09-23 @ 03:19) (30 - 51)  BP: 166/77 (06-09-23 @ 03:19) (166/77 - 203/97)  RR: 18 (06-09-23 @ 03:19) (18 - 19)  SpO2: 96% (06-09-23 @ 03:19) (96% - 98%)    GENERAL: NAD, lying in bed comfortably  NECK: Supple, mild JVD (12cm)   CHEST/LUNG: Clear to auscultation bilaterally; No rales, rhonchi, wheezing, or rubs. Unlabored respirations  HEART: Regular rate and rhythm; No murmurs, rubs, or gallops  ABDOMEN: BSx4; Soft, nontender, nondistended  EXTREMITIES:  2+ Peripheral Pulses, brisk capillary refill. No clubbing, cyanosis, or edema  NERVOUS SYSTEM:  A&Ox3, no focal deficits   SKIN: No rashes or lesions

## 2023-06-09 NOTE — H&P ADULT - ATTENDING COMMENTS
69 yo M with PMHx significant for HTN, HLD, DM, CAD s/p CABG (f/u with Dr. Ced Neff) presented for chest pain.    #Cardiac chest pain   #Stable angina vs HTN urgency   #TWI/Q waves in inferior leads -  Acute vs Chronic ?  #Symptomatic Bradycardia with possible variable AVB  #Hx of CAD s/p CABG   No ACTIVE chest pain - Lightheadedness resolved   Cardiology on board, dw cardio  - Monitor for signs of chest pain lightheadedness  - Atropine at bedside  - Holding all AV je blockers  - Strict I/O    - Hold Metoprolol for now s  - f/u cardiology - will probably need cath based on pt's recent stress test findings  - Aspirin loaded - c/w 81 daily   - Atorvastatin 80     #HTN  #DM  -a1c/lipid profile/TSH   - hold home metoprolol 50 succinate  - sliding scale    - c/w home meds     DVT PPX, Lovenox    #Progress Note Handoff  Pending (specify): cardio f/u regarding need for C  Family discussion: susana pt regarding eval for chest pain  Disposition: Home

## 2023-06-09 NOTE — H&P ADULT - HISTORY OF PRESENT ILLNESS
ROS   Positive   Negative fever, chills, nausea, vomiting, chest pain, abdominal pain, sob, cough, constipation, diarrhea, incontinence    ED Vital Signs Last 24 Hrs  T(C): 36.7 (09 Jun 2023 00:19), Max: 36.7 (09 Jun 2023 00:19)  T(F): 98.1 (09 Jun 2023 00:19), Max: 98.1 (09 Jun 2023 00:19)  HR: 30 (09 Jun 2023 03:19) (30 - 51)  BP: 166/77 (09 Jun 2023 03:19) (166/77 - 203/97)  RR: 18 (09 Jun 2023 03:19) (18 - 19)  SpO2: 96% (09 Jun 2023 03:19) (96% - 98%)  O2 Parameters below as of 09 Jun 2023 03:19  Patient On (Oxygen Delivery Method): room air    Labs significant for trops neg -    EKG Deep Q waves in inferior leads - TWI in III, avF - Atrial fibrillation with SVR   CXR mild pulmonary vascular congestion otherwise no acute cardiopulm abn   In the ED given aspirin 324          67 yo M with PMHx significant for HTN, HLD, DM, CAD s/p CABG (f/u with Dr. Ced Neff) presented for chest pain.  He felt overnight while working on the computer chest pain crushing that radiates to his L arm for a period of time of 1 hours approximately and decided to come to the ED. He had associated lightheadedness and BP at home 208 SBP which is not normal. He reports his chest pain is similar to when he had CABG but is lighter. He did not had any sweating, palpitations at time of event.  He does not have any interval chest pain, AVINA, palpitations - He denies any history of arrythmia or CHF  He f/up with Dr. Neff and had a recent echo done not long ago - is compliant with his medications (does no remeber exactly which one he takes)     ROS   Positive as above  Negative fever, chills, nausea, vomiting, abdominal pain, sob, cough, constipation, diarrhea, incontinence    ED Vital Signs Last 24 Hrs  T(C): 36.7 (09 Jun 2023 00:19), Max: 36.7 (09 Jun 2023 00:19)  T(F): 98.1 (09 Jun 2023 00:19), Max: 98.1 (09 Jun 2023 00:19)  HR: 30 (09 Jun 2023 03:19) (30 - 51)  BP: 166/77 (09 Jun 2023 03:19) (166/77 - 203/97)  RR: 18 (09 Jun 2023 03:19) (18 - 19)  SpO2: 96% (09 Jun 2023 03:19) (96% - 98%)  O2 Parameters below as of 09 Jun 2023 03:19  Patient On (Oxygen Delivery Method): room air    Labs significant for trops neg -    EKG Deep Q waves in inferior leads - TWI in III, avF - Atrial fibrillation with SVR   CXR mild pulmonary vascular congestion otherwise no acute cardiopulm abn   In the ED given aspirin 324   Cardiology fellow made aware after examining patient          69 yo M with PMHx significant for HTN, HLD, DM, CAD s/p CABG (f/u with Dr. Ced Neff) presented for chest pain.  He felt overnight while working on the computer chest pain crushing that radiates to his L arm for a period of time of 1 hours approximately and decided to come to the ED. He had associated lightheadedness and BP at home 208 SBP which is not normal. He reports his chest pain is similar to when he had CABG but is lighter. He did not had any sweating, palpitations at time of event.  He does not have any interval chest pain, AVINA, palpitations - He denies any history of arrythmia or CHF  He f/up with Dr. Neff and had a recent echo done not long ago - is compliant with his medications (does no remeber exactly which one he takes)     Med rec incomplete - He doesnt have a med list and his pharmacy is [facility] - Last time he took his meds including metop succ 50 was yesterday 6:00 AM     ROS   Positive as above  Negative fever, chills, nausea, vomiting, abdominal pain, sob, cough, constipation, diarrhea, incontinence    ED Vital Signs Last 24 Hrs  T(C): 36.7 (09 Jun 2023 00:19), Max: 36.7 (09 Jun 2023 00:19)  T(F): 98.1 (09 Jun 2023 00:19), Max: 98.1 (09 Jun 2023 00:19)  HR: 30 (09 Jun 2023 03:19) (30 - 51)  BP: 166/77 (09 Jun 2023 03:19) (166/77 - 203/97)  RR: 18 (09 Jun 2023 03:19) (18 - 19)  SpO2: 96% (09 Jun 2023 03:19) (96% - 98%)  O2 Parameters below as of 09 Jun 2023 03:19  Patient On (Oxygen Delivery Method): room air    Labs significant for trops neg -    EKG Deep Q waves in inferior leads - TWI in III, avF - Atrial fibrillation with SVR   CXR mild pulmonary vascular congestion otherwise no acute cardiopulm abn   In the ED given aspirin 324   Cardiology fellow made aware after examining patient

## 2023-06-12 ENCOUNTER — INPATIENT (INPATIENT)
Facility: HOSPITAL | Age: 68
LOS: 0 days | Discharge: HOME | DRG: 247 | End: 2023-06-13
Attending: INTERNAL MEDICINE | Admitting: INTERNAL MEDICINE
Payer: MEDICARE

## 2023-06-12 VITALS
DIASTOLIC BLOOD PRESSURE: 67 MMHG | SYSTOLIC BLOOD PRESSURE: 140 MMHG | WEIGHT: 205.03 LBS | RESPIRATION RATE: 16 BRPM | HEIGHT: 74 IN | TEMPERATURE: 98 F | HEART RATE: 59 BPM | OXYGEN SATURATION: 98 %

## 2023-06-12 DIAGNOSIS — Z95.1 PRESENCE OF AORTOCORONARY BYPASS GRAFT: Chronic | ICD-10-CM

## 2023-06-12 DIAGNOSIS — R07.9 CHEST PAIN, UNSPECIFIED: ICD-10-CM

## 2023-06-12 LAB
ALBUMIN SERPL ELPH-MCNC: 4.4 G/DL — SIGNIFICANT CHANGE UP (ref 3.5–5.2)
ALP SERPL-CCNC: 61 U/L — SIGNIFICANT CHANGE UP (ref 30–115)
ALT FLD-CCNC: 31 U/L — SIGNIFICANT CHANGE UP (ref 0–41)
ANION GAP SERPL CALC-SCNC: 12 MMOL/L — SIGNIFICANT CHANGE UP (ref 7–14)
AST SERPL-CCNC: 21 U/L — SIGNIFICANT CHANGE UP (ref 0–41)
BASOPHILS # BLD AUTO: 0.03 K/UL — SIGNIFICANT CHANGE UP (ref 0–0.2)
BASOPHILS NFR BLD AUTO: 0.5 % — SIGNIFICANT CHANGE UP (ref 0–1)
BILIRUB SERPL-MCNC: 0.3 MG/DL — SIGNIFICANT CHANGE UP (ref 0.2–1.2)
BUN SERPL-MCNC: 20 MG/DL — SIGNIFICANT CHANGE UP (ref 10–20)
CALCIUM SERPL-MCNC: 9.4 MG/DL — SIGNIFICANT CHANGE UP (ref 8.4–10.5)
CHLORIDE SERPL-SCNC: 106 MMOL/L — SIGNIFICANT CHANGE UP (ref 98–110)
CO2 SERPL-SCNC: 27 MMOL/L — SIGNIFICANT CHANGE UP (ref 17–32)
CREAT SERPL-MCNC: 0.8 MG/DL — SIGNIFICANT CHANGE UP (ref 0.7–1.5)
EGFR: 96 ML/MIN/1.73M2 — SIGNIFICANT CHANGE UP
EOSINOPHIL # BLD AUTO: 0.33 K/UL — SIGNIFICANT CHANGE UP (ref 0–0.7)
EOSINOPHIL NFR BLD AUTO: 5.8 % — SIGNIFICANT CHANGE UP (ref 0–8)
GLUCOSE SERPL-MCNC: 191 MG/DL — HIGH (ref 70–99)
HCT VFR BLD CALC: 42.6 % — SIGNIFICANT CHANGE UP (ref 42–52)
HGB BLD-MCNC: 14.1 G/DL — SIGNIFICANT CHANGE UP (ref 14–18)
IMM GRANULOCYTES NFR BLD AUTO: 0.2 % — SIGNIFICANT CHANGE UP (ref 0.1–0.3)
LYMPHOCYTES # BLD AUTO: 1.96 K/UL — SIGNIFICANT CHANGE UP (ref 1.2–3.4)
LYMPHOCYTES # BLD AUTO: 34.6 % — SIGNIFICANT CHANGE UP (ref 20.5–51.1)
MAGNESIUM SERPL-MCNC: 2.2 MG/DL — SIGNIFICANT CHANGE UP (ref 1.8–2.4)
MCHC RBC-ENTMCNC: 29 PG — SIGNIFICANT CHANGE UP (ref 27–31)
MCHC RBC-ENTMCNC: 33.1 G/DL — SIGNIFICANT CHANGE UP (ref 32–37)
MCV RBC AUTO: 87.7 FL — SIGNIFICANT CHANGE UP (ref 80–94)
MONOCYTES # BLD AUTO: 0.79 K/UL — HIGH (ref 0.1–0.6)
MONOCYTES NFR BLD AUTO: 14 % — HIGH (ref 1.7–9.3)
NEUTROPHILS # BLD AUTO: 2.54 K/UL — SIGNIFICANT CHANGE UP (ref 1.4–6.5)
NEUTROPHILS NFR BLD AUTO: 44.9 % — SIGNIFICANT CHANGE UP (ref 42.2–75.2)
NRBC # BLD: 0 /100 WBCS — SIGNIFICANT CHANGE UP (ref 0–0)
NT-PROBNP SERPL-SCNC: 181 PG/ML — SIGNIFICANT CHANGE UP (ref 0–300)
PLATELET # BLD AUTO: 209 K/UL — SIGNIFICANT CHANGE UP (ref 130–400)
PMV BLD: 10.7 FL — HIGH (ref 7.4–10.4)
POTASSIUM SERPL-MCNC: 5 MMOL/L — SIGNIFICANT CHANGE UP (ref 3.5–5)
POTASSIUM SERPL-SCNC: 5 MMOL/L — SIGNIFICANT CHANGE UP (ref 3.5–5)
PROT SERPL-MCNC: 7.3 G/DL — SIGNIFICANT CHANGE UP (ref 6–8)
RBC # BLD: 4.86 M/UL — SIGNIFICANT CHANGE UP (ref 4.7–6.1)
RBC # FLD: 13.7 % — SIGNIFICANT CHANGE UP (ref 11.5–14.5)
SODIUM SERPL-SCNC: 145 MMOL/L — SIGNIFICANT CHANGE UP (ref 135–146)
TROPONIN T SERPL-MCNC: <0.01 NG/ML — SIGNIFICANT CHANGE UP
WBC # BLD: 5.66 K/UL — SIGNIFICANT CHANGE UP (ref 4.8–10.8)
WBC # FLD AUTO: 5.66 K/UL — SIGNIFICANT CHANGE UP (ref 4.8–10.8)

## 2023-06-12 PROCEDURE — 92928 PRQ TCAT PLMT NTRAC ST 1 LES: CPT | Mod: LC

## 2023-06-12 PROCEDURE — 92978 ENDOLUMINL IVUS OCT C 1ST: CPT | Mod: LC

## 2023-06-12 PROCEDURE — C1769: CPT

## 2023-06-12 PROCEDURE — 82962 GLUCOSE BLOOD TEST: CPT

## 2023-06-12 PROCEDURE — 85025 COMPLETE CBC W/AUTO DIFF WBC: CPT

## 2023-06-12 PROCEDURE — C1874: CPT

## 2023-06-12 PROCEDURE — 36415 COLL VENOUS BLD VENIPUNCTURE: CPT

## 2023-06-12 PROCEDURE — C1887: CPT

## 2023-06-12 PROCEDURE — 71045 X-RAY EXAM CHEST 1 VIEW: CPT | Mod: 26

## 2023-06-12 PROCEDURE — 83036 HEMOGLOBIN GLYCOSYLATED A1C: CPT

## 2023-06-12 PROCEDURE — C1725: CPT

## 2023-06-12 PROCEDURE — C1761: CPT

## 2023-06-12 PROCEDURE — 93010 ELECTROCARDIOGRAM REPORT: CPT | Mod: 77

## 2023-06-12 PROCEDURE — 80053 COMPREHEN METABOLIC PANEL: CPT

## 2023-06-12 PROCEDURE — 93010 ELECTROCARDIOGRAM REPORT: CPT

## 2023-06-12 PROCEDURE — 80061 LIPID PANEL: CPT

## 2023-06-12 PROCEDURE — 99223 1ST HOSP IP/OBS HIGH 75: CPT

## 2023-06-12 PROCEDURE — C1894: CPT

## 2023-06-12 PROCEDURE — 93459 L HRT ART/GRFT ANGIO: CPT | Mod: 59

## 2023-06-12 PROCEDURE — 93005 ELECTROCARDIOGRAM TRACING: CPT

## 2023-06-12 PROCEDURE — 86803 HEPATITIS C AB TEST: CPT

## 2023-06-12 PROCEDURE — 0715T: CPT

## 2023-06-12 PROCEDURE — C1753: CPT

## 2023-06-12 PROCEDURE — 99285 EMERGENCY DEPT VISIT HI MDM: CPT

## 2023-06-12 PROCEDURE — C1760: CPT

## 2023-06-12 PROCEDURE — 85610 PROTHROMBIN TIME: CPT

## 2023-06-12 PROCEDURE — 85730 THROMBOPLASTIN TIME PARTIAL: CPT

## 2023-06-12 RX ORDER — CLOPIDOGREL BISULFATE 75 MG/1
75 TABLET, FILM COATED ORAL DAILY
Refills: 0 | Status: DISCONTINUED | OUTPATIENT
Start: 2023-06-13 | End: 2023-06-13

## 2023-06-12 RX ORDER — ONDANSETRON 8 MG/1
4 TABLET, FILM COATED ORAL EVERY 8 HOURS
Refills: 0 | Status: DISCONTINUED | OUTPATIENT
Start: 2023-06-12 | End: 2023-06-13

## 2023-06-12 RX ORDER — RANOLAZINE 500 MG/1
1 TABLET, FILM COATED, EXTENDED RELEASE ORAL
Refills: 0 | DISCHARGE

## 2023-06-12 RX ORDER — HYDRALAZINE HCL 50 MG
25 TABLET ORAL DAILY
Refills: 0 | Status: DISCONTINUED | OUTPATIENT
Start: 2023-06-12 | End: 2023-06-12

## 2023-06-12 RX ORDER — LISINOPRIL 2.5 MG/1
20 TABLET ORAL DAILY
Refills: 0 | Status: DISCONTINUED | OUTPATIENT
Start: 2023-06-13 | End: 2023-06-13

## 2023-06-12 RX ORDER — METFORMIN HYDROCHLORIDE 850 MG/1
1 TABLET ORAL
Refills: 0 | DISCHARGE

## 2023-06-12 RX ORDER — SODIUM CHLORIDE 9 MG/ML
1000 INJECTION, SOLUTION INTRAVENOUS
Refills: 0 | Status: DISCONTINUED | OUTPATIENT
Start: 2023-06-12 | End: 2023-06-12

## 2023-06-12 RX ORDER — OLMESARTAN MEDOXOMIL 5 MG/1
1 TABLET, FILM COATED ORAL
Refills: 0 | DISCHARGE

## 2023-06-12 RX ORDER — METOPROLOL TARTRATE 50 MG
1 TABLET ORAL
Refills: 0 | DISCHARGE

## 2023-06-12 RX ORDER — EMPAGLIFLOZIN 10 MG/1
1 TABLET, FILM COATED ORAL
Refills: 0 | DISCHARGE

## 2023-06-12 RX ORDER — HYDRALAZINE HCL 50 MG
25 TABLET ORAL ONCE
Refills: 0 | Status: COMPLETED | OUTPATIENT
Start: 2023-06-12 | End: 2023-06-12

## 2023-06-12 RX ORDER — INSULIN LISPRO 100/ML
VIAL (ML) SUBCUTANEOUS
Refills: 0 | Status: DISCONTINUED | OUTPATIENT
Start: 2023-06-12 | End: 2023-06-13

## 2023-06-12 RX ORDER — SODIUM CHLORIDE 9 MG/ML
1000 INJECTION INTRAMUSCULAR; INTRAVENOUS; SUBCUTANEOUS
Refills: 0 | Status: DISCONTINUED | OUTPATIENT
Start: 2023-06-12 | End: 2023-06-13

## 2023-06-12 RX ORDER — CLOPIDOGREL BISULFATE 75 MG/1
1 TABLET, FILM COATED ORAL
Refills: 0 | DISCHARGE

## 2023-06-12 RX ORDER — GLUCAGON INJECTION, SOLUTION 0.5 MG/.1ML
1 INJECTION, SOLUTION SUBCUTANEOUS ONCE
Refills: 0 | Status: DISCONTINUED | OUTPATIENT
Start: 2023-06-12 | End: 2023-06-13

## 2023-06-12 RX ORDER — SODIUM CHLORIDE 9 MG/ML
1000 INJECTION, SOLUTION INTRAVENOUS
Refills: 0 | Status: DISCONTINUED | OUTPATIENT
Start: 2023-06-12 | End: 2023-06-13

## 2023-06-12 RX ORDER — INSULIN GLARGINE 100 [IU]/ML
10 INJECTION, SOLUTION SUBCUTANEOUS AT BEDTIME
Refills: 0 | Status: DISCONTINUED | OUTPATIENT
Start: 2023-06-12 | End: 2023-06-13

## 2023-06-12 RX ORDER — ATORVASTATIN CALCIUM 80 MG/1
80 TABLET, FILM COATED ORAL AT BEDTIME
Refills: 0 | Status: DISCONTINUED | OUTPATIENT
Start: 2023-06-12 | End: 2023-06-13

## 2023-06-12 RX ORDER — ACETAMINOPHEN 500 MG
650 TABLET ORAL EVERY 6 HOURS
Refills: 0 | Status: DISCONTINUED | OUTPATIENT
Start: 2023-06-12 | End: 2023-06-13

## 2023-06-12 RX ORDER — RANOLAZINE 500 MG/1
500 TABLET, FILM COATED, EXTENDED RELEASE ORAL
Refills: 0 | Status: DISCONTINUED | OUTPATIENT
Start: 2023-06-12 | End: 2023-06-13

## 2023-06-12 RX ORDER — DEXTROSE 50 % IN WATER 50 %
25 SYRINGE (ML) INTRAVENOUS ONCE
Refills: 0 | Status: DISCONTINUED | OUTPATIENT
Start: 2023-06-12 | End: 2023-06-13

## 2023-06-12 RX ORDER — LANOLIN ALCOHOL/MO/W.PET/CERES
3 CREAM (GRAM) TOPICAL AT BEDTIME
Refills: 0 | Status: DISCONTINUED | OUTPATIENT
Start: 2023-06-12 | End: 2023-06-13

## 2023-06-12 RX ORDER — HYDROCHLOROTHIAZIDE 25 MG
1 TABLET ORAL
Refills: 0 | DISCHARGE

## 2023-06-12 RX ORDER — DEXTROSE 50 % IN WATER 50 %
15 SYRINGE (ML) INTRAVENOUS ONCE
Refills: 0 | Status: DISCONTINUED | OUTPATIENT
Start: 2023-06-12 | End: 2023-06-13

## 2023-06-12 RX ORDER — ASPIRIN/CALCIUM CARB/MAGNESIUM 324 MG
81 TABLET ORAL DAILY
Refills: 0 | Status: DISCONTINUED | OUTPATIENT
Start: 2023-06-13 | End: 2023-06-13

## 2023-06-12 RX ORDER — AMLODIPINE BESYLATE 2.5 MG/1
10 TABLET ORAL DAILY
Refills: 0 | Status: DISCONTINUED | OUTPATIENT
Start: 2023-06-12 | End: 2023-06-13

## 2023-06-12 RX ORDER — HYDRALAZINE HCL 50 MG
25 TABLET ORAL THREE TIMES A DAY
Refills: 0 | Status: DISCONTINUED | OUTPATIENT
Start: 2023-06-12 | End: 2023-06-13

## 2023-06-12 RX ORDER — ASPIRIN/CALCIUM CARB/MAGNESIUM 324 MG
243 TABLET ORAL ONCE
Refills: 0 | Status: COMPLETED | OUTPATIENT
Start: 2023-06-12 | End: 2023-06-12

## 2023-06-12 RX ADMIN — Medication 25 MILLIGRAM(S): at 22:34

## 2023-06-12 RX ADMIN — Medication 2.5 MILLIGRAM(S): at 21:30

## 2023-06-12 RX ADMIN — Medication 243 MILLIGRAM(S): at 11:38

## 2023-06-12 RX ADMIN — SODIUM CHLORIDE 100 MILLILITER(S): 9 INJECTION INTRAMUSCULAR; INTRAVENOUS; SUBCUTANEOUS at 20:32

## 2023-06-12 RX ADMIN — Medication 25 MILLIGRAM(S): at 23:51

## 2023-06-12 RX ADMIN — ATORVASTATIN CALCIUM 80 MILLIGRAM(S): 80 TABLET, FILM COATED ORAL at 22:34

## 2023-06-12 NOTE — CHART NOTE - NSCHARTNOTEFT_GEN_A_CORE
PRE-OP DIAGNOSIS:    Unstable Angina, AUC 7    PROCEDURE:     [x] Coronary Angiogram     [x] LHC     [x] LVG     [] RHC     [x] Intervention (see below)         PHYSICIAN:  Dr Neff    ASSISTANT: Dr ESTEFANIA Infante     PROCEDURE DESCRIPTION:     Consent:      [x] Patient     [] Family Member     []  Used        Anesthesia:     [] General     [x] Sedation     [x] Local        Access & Closure:     [x] 6 Fr Right Femoral Artery (Angioseal closure)    [] Fr Femoral Vein     [] Fr Brachial Vein       IV Contrast: 160 mL        Intervention: successful IVUS guided balloon angioplasty shockwave lithotripsy and JEAN X 1 of LM into Cx stenosis      Implants: Megatron 3.5 X 28 LM into Cx       FINDINGS:     Coronary Dominance: Co dominant      LM: moderate disease    LAD: 100%  distal vessel has severe diffuse disease supplied by patent LIMA to LAD  D1 severe diffuse disease    CX: ostial Cx 95% steonsis s/p intervention. 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 LAD, RCA and Cx stenosis s/p intervention of Cx. Patent LIMA to LAD        PLAN OF CARE:     [x] Return to In-patient bed     [x] Medications: Aspirin 81 mg qd, Plavix 75mg qd, Lipitor 80mg qd, Amlodipine 10mg qd, Losartan 50 mg qd. Change Hydralazine to 25mg q8    [x] IV Fluids: IV NS @ 100cc/hr for 10 hours PRE-OP DIAGNOSIS:    Unstable Angina, AUC 7    PROCEDURE:     [x] Coronary Angiogram     [x] LHC     [x] LVG     [] RHC     [x] Intervention (see below)         PHYSICIAN:  Dr Neff    ASSISTANT: Dr ESTEFANIA Infante     PROCEDURE DESCRIPTION:     Consent:      [x] Patient     [] Family Member     []  Used        Anesthesia:     [] General     [x] Sedation     [x] Local        Access & Closure:     [x] 6 Fr Right Femoral Artery (Angioseal closure)    [] Fr Femoral Vein     [] Fr Brachial Vein       IV Contrast: 160 mL        Intervention: successful IVUS guided balloon angioplasty shockwave lithotripsy and JEAN X 1 of LM into Cx stenosis      Implants: Megatron 3.5 X 28 LM into Cx       FINDINGS:     Coronary Dominance: Co dominant      LM: moderate disease    LAD: 100%  distal vessel has severe diffuse disease supplied by patent LIMA to LAD  D1 severe diffuse disease    CX: ostial Cx 95% steonsis s/p intervention. 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 LAD, RCA and Cx stenosis s/p intervention of Cx. Patent LIMA to LAD        PLAN OF CARE:     [x] Return to In-patient bed     [x] Medications: Aspirin 81 mg qd, Plavix 75mg qd, Lipitor 80mg qd, Amlodipine 10mg qd, Lisinopril 20 mg qd. Change Hydralazine to 25mg q8    [x] IV Fluids: IV NS @ 100cc/hr for 10 hours PRE-OP DIAGNOSIS:    Unstable Angina, AUC 7    PROCEDURE:     [x] Coronary Angiogram     [x] LHC     [x] LVG     [] RHC     [x] Intervention (see below)         PHYSICIAN:  Dr Neff    ASSISTANT: Dr ESTEFANIA Infante     PROCEDURE DESCRIPTION:     Consent:      [x] Patient     [] Family Member     []  Used        Anesthesia:     [] General     [x] Sedation     [x] Local        Access & Closure:     [x] 6 Fr Right Femoral Artery (Angioseal closure)    [] Fr Femoral Vein     [] Fr Brachial Vein       IV Contrast: 160 mL        Intervention: successful IVUS guided balloon angioplasty shockwave lithotripsy and JEAN X 1 of LM into Cx stenosis      Implants: Megatron 3.5 X 28 LM into Cx       FINDINGS:     Coronary Dominance: Co dominant      LM: moderate disease    LAD: 100%  distal vessel has severe diffuse disease supplied by patent LIMA to LAD  D1 severe diffuse disease    CX: ostial Cx 95% steonsis s/p intervention. 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 with 80% stenosis in mid RCA     LIMA: patent LIMA to LAD    SVG: Occluded       LVEDP: 12 mmHg        ESTIMATED BLOOD LOSS: < 10 mL        CONDITION:     [x] Good     [] Fair     [] Critical        SPECIMEN REMOVED: N/A       POST-OP DIAGNOSIS:      [x] 3 Vessel Coronary Artery Disease. LAD, RCA and Cx stenosis s/p intervention of Cx. Patent LIMA to LAD        PLAN OF CARE:     [x] Return to In-patient bed     [x] Medications: Aspirin 81 mg qd, Plavix 75mg qd, Lipitor 80mg qd, Amlodipine 10mg qd, Lisinopril 20 mg qd. Change Hydralazine to 25mg q8    [x] IV Fluids: IV NS @ 100cc/hr for 10 hours

## 2023-06-12 NOTE — CONSULT NOTE ADULT - SUBJECTIVE AND OBJECTIVE BOX
Patient is a 68y old  Male who presents with a chief complaint of     HPI:  67 yo M with PMHx significant for HTN, HLD, DM, CAD s/p CABG (f/u with Dr. Ced Neff) presented for chest pain.  Pt presented to the ED 2 days ago for CP.  It was recommended that he get a catheterization but pt eloped.  Pt returned to the ED today - he still has left sided CP/pressure and a heaviness in his left arm.  He had associated lightheadedness and BP at home 208 SBP which is not normal. He reports his chest pain is similar to when he had CABG but is lighter. He did not have any sweating, palpitations at time of event. he presented today for the same pain radiating to the left arm. no lightheadedness, no N/ V no pain, no fever no chills.   pt had an Unstable angina and  Sinus bradycardia with 1st degree AV block and blocked PACs.  HR is currently in the high 30's/low 40s    TTE Echo Complete w/o Contrast w/ Doppler (06.09.23 @ 10:14) >   LV Ejection Fraction by Johnson's Method with a biplane EF of 62 %.  pt received aspirin 234 mg x1  pt is admitted for workup/management    PAST MEDICAL & SURGICAL HISTORY:  Mild HTN    HLD (hyperlipidemia)    CAD (coronary artery disease)    S/P CABG x 4    PREVIOUS DIAGNOSTIC TESTING:      ECHO  FINDINGS:  < from: TTE Echo Complete w/o Contrast w/ Doppler (06.09.23 @ 10:14) >  Summary:   1. Normal global left ventricular systolic function.   2. LV Ejection Fraction by Johnson's Method with a biplane EF of 62 %.   3. Normal left ventricular internal cavity size.   4. Mildly enlarged left atrium.   5. Normal right atrial size.   6. There is no evidence of pericardial effusion.   7. Mild mitral annular calcification.   8. Mild mitral valve regurgitation.   9. Mild thickening and calcification of the anterior and posterior   mitral valve leaflets.  10. Mild-moderate tricuspid regurgitation.    < end of copied text >    MEDICATIONS  (STANDING):  amLODIPine   Tablet 10 milliGRAM(s) Oral daily  atorvastatin 80 milliGRAM(s) Oral at bedtime  dextrose 5%. 1000 milliLiter(s) (50 mL/Hr) IV Continuous <Continuous>  dextrose 50% Injectable 25 Gram(s) IV Push once  glucagon  Injectable 1 milliGRAM(s) IntraMuscular once  hydrALAZINE 25 milliGRAM(s) Oral daily  insulin glargine Injectable (LANTUS) 10 Unit(s) SubCutaneous at bedtime  insulin lispro (ADMELOG) corrective regimen sliding scale   SubCutaneous three times a day before meals  lactated ringers. 1000 milliLiter(s) (75 mL/Hr) IV Continuous <Continuous>  ranolazine 500 milliGRAM(s) Oral two times a day    MEDICATIONS  (PRN):  acetaminophen     Tablet .. 650 milliGRAM(s) Oral every 6 hours PRN Temp greater or equal to 38C (100.4F), Mild Pain (1 - 3)  aluminum hydroxide/magnesium hydroxide/simethicone Suspension 30 milliLiter(s) Oral every 4 hours PRN Dyspepsia  dextrose Oral Gel 15 Gram(s) Oral once PRN Blood Glucose LESS THAN 70 milliGRAM(s)/deciliter  melatonin 3 milliGRAM(s) Oral at bedtime PRN Insomnia  ondansetron Injectable 4 milliGRAM(s) IV Push every 8 hours PRN Nausea and/or Vomiting    FAMILY HISTORY:    SOCIAL HISTORY:    CIGARETTES: none    ALCOHOL: none    Past Surgical History:    Allergies:    No Known Allergies      REVIEW OF SYSTEMS:  as above    Vital Signs Last 24 Hrs  T(C): 36.8 (12 Jun 2023 10:36), Max: 36.8 (12 Jun 2023 10:36)  T(F): 98.3 (12 Jun 2023 10:36), Max: 98.3 (12 Jun 2023 10:36)  HR: 59 (12 Jun 2023 10:36) (59 - 59)  BP: 140/67 (12 Jun 2023 10:36) (140/67 - 140/67)  BP(mean): --  RR: 16 (12 Jun 2023 10:36) (16 - 16)  SpO2: 98% (12 Jun 2023 10:36) (98% - 98%)    Parameters below as of 12 Jun 2023 10:36  Patient On (Oxygen Delivery Method): room air      PHYSICAL EXAM:    GENERAL: In no apparent distress, well nourished, and hydrated.  HEART: Regular rate and rhythm; No murmurs, rubs, or gallops.  PULMONARY: Clear to auscultation and perfusion.  No rales, wheezing, or rhonchi bilaterally.  ABDOMEN: Soft, Nontender, Nondistended; Bowel sounds present  EXTREMITIES:  2+ Peripheral Pulses, No clubbing, cyanosis, or edema  NEUROLOGICAL: Grossly nonfocal    INTERPRETATION OF TELEMETRY:  Stalin 38, 1st degree AVB    ECG:  < from: 12 Lead ECG (06.09.23 @ 01:29) >  Ventricular Rate 48 BPM    Atrial Rate 48 BPM    P-R Interval 384 ms    QRS Duration 148 ms    Q-T Interval 478 ms    QTC Calculation(Bazett) 427 ms    P Axis 8 degrees    R Axis -49 degrees    T Axis -23 degrees    Diagnosis Line Sinus bradycardiawith 1st degree A-V block  Left axis deviation  Right bundle branch block  T wave abnormality, consider inferior ischemia  Abnormal ECG    < end of copied text >    LABS:                        14.1   5.66  )-----------( 209      ( 12 Jun 2023 12:04 )             42.6     06-12    145  |  106  |  20  ----------------------------<  191<H>  5.0   |  27  |  0.8    Ca    9.4      12 Jun 2023 12:04  Mg     2.2     06-12    TPro  7.3  /  Alb  4.4  /  TBili  0.3  /  DBili  x   /  AST  21  /  ALT  31  /  AlkPhos  61  06-12    CARDIAC MARKERS ( 12 Jun 2023 12:04 )  x     / <0.01 ng/mL / x     / x     / x        BNP    RADIOLOGY & ADDITIONAL STUDIES:

## 2023-06-12 NOTE — ED PROVIDER NOTE - DIFFERENTIAL DIAGNOSIS
ACS, CHF, ptx, pneumonia, pleural effusion, pneumomediastinum, PE, esophageal perf Differential Diagnosis

## 2023-06-12 NOTE — CHART NOTE - NSCHARTNOTEFT_GEN_A_CORE
INTERVENTIONAL CARDIOLOGY NP:    INTERVENTIONAL CARDIOLOGY NP:                                               PREOPERATIVE DAY OF PROCEDURE EVALUATION:  I have personally seen and examined the patient.  I agree with the history and physical which I have reviewed and noted any changes below.     69 yo M with PMHx significant for HTN, HLD, DM, CAD s/p CABG (f/u with Dr. Ced Neff) presented for chest pain.  last Friday He felt overnight while working on the computer chest pain crushing that radiates to his L arm for a period of time of 1 hours approximately and decided to come to the ED. He had associated lightheadedness and BP at home 208 SBP which is not normal. He reports his chest pain is similar to when he had CABG but is lighter. He did not have any sweating, palpitations at time of event. he presented today for the same pain radiating to the left arm. no lightheadedness, no N/ V no pain, no fever no chills.   pt had an Unstable angina and  Sinus bradycardia with 1st degree AV block and blocked PACs . he was seen by cardiology and EP  last admission.   pt eloped last Friday and came back for cardiac cath today. EP plan: cardiac cath today and Pacemaker tomorrow in cath is negative.       Bleeding Risk Score:  1%  L Nelson test: Positive   NS 250cc IV bolus x 1 over 1 hr  Pt loaded with aspirin on 6/12       (Signed electronically by __________)  06-12-23 @ 15:57

## 2023-06-12 NOTE — H&P ADULT - NSHPLABSRESULTS_GEN_ALL_CORE
14.1   5.66  )-----------( 209      ( 12 Jun 2023 12:04 )             42.6       06-12    145  |  106  |  x   ----------------------------<  x   5.0   |  x   |  0.8      TPro  x   /  Alb  4.4  /  TBili  x   /  DBili  x   /  AST  x   /  ALT  31  /  AlkPhos  61  06-12                      Lactate Trend  06-09 @ 04:40 Lactate:1.2       CARDIAC MARKERS ( 12 Jun 2023 12:04 )  x     / <0.01 ng/mL / x     / x     / x            CAPILLARY BLOOD GLUCOSE

## 2023-06-12 NOTE — H&P ADULT - ASSESSMENT
67 yo M with PMHx significant for HTN, HLD, DM, CAD s/p CABG (f/u with Dr. Ced Neff) presented for chest pain.    #Cardiac chest pain     #TWI/Q waves in inferior leads -  Acute vs Chronic ?  #Symptomatic Bradycardia with possible variable AVB likely 2/2 ischemia >> metoprolol induced  #Hx of CAD s/p CABG   #HTN  # Unstable angina  - Monitor for signs of chest pain lightheadedness  - Serial CE/EKG   - Telemetry    - Strict I/O    - Hold Metoprolol for now     - Aspirin loaded - c/w 81 daily   - Atorvastatin 80        - admit to CCU  - f/u serial troponin q 4-6 h  - TTE Echo Complete w/o Contrast w/ Doppler (06.09.23 @ 10:14) > EF of 62 %.  - sp  mg in the ED.   - start on ASA, plavix, and IV heparin. Maintain PTT between 55-70  - f/u fasting lipid panel and HbA1c to assess ASCVD risk  - start on high intensity statin treatmenthold olmesartan, HCTZ  planned for cath today  NPO   IVF 75 cc/h    # H/o DM  -Monitor blood glucose  -Check A1c  - hold po meds  -goal glucose level: 140-180 mg/dl  -start basal/bolus insulin if FS >180    #DVT - lovenox sq     #GI - pantoprazole     #Diet - DASH/CC - NPO for now     #Activity - IAT     #Code - Full code      67 yo M with PMHx significant for HTN, HLD, DM, CAD s/p CABG (f/u with Dr. Ced Neff) presented for chest pain.    #Cardiac chest pain   #TWI/Q waves in inferior leads -  Acute vs Chronic ?  #Symptomatic Bradycardia with possible variable AVB likely 2/2 ischemia >> metoprolol induced  #Hx of CAD s/p CABG   #HTN  # Unstable angina  - Monitor for signs of chest pain lightheadedness  - Serial CE/EKG   - Telemetry    - Strict I/O    - Aspirin loaded - c/w 81 daily   - Atorvastatin 80   - TTE Echo Complete w/o Contrast w/ Doppler (06.09.23 @ 10:14) > EF of 62 %.  - sp  mg in the ED.   - f/u fasting lipid panel and HbA1c to assess ASCVD risk  - hold olmesartan,   - planned for cath today  - NPO   - IVF 75 cc/h  - if cath is negative, make pt NPO after MN for pacemaker tomorrow.     # H/o DM  -Monitor blood glucose  -Check A1c  - hold po meds  -goal glucose level: 140-180 mg/dl  -start basal/bolus insulin if FS >180    #DVT - lovenox sq     #GI - pantoprazole     #Diet - DASH/CC - NPO for now     #Activity - IAT     #Code - Full code

## 2023-06-12 NOTE — H&P ADULT - HISTORY OF PRESENT ILLNESS
69 yo M with PMHx significant for HTN, HLD, DM, CAD s/p CABG (f/u with Dr. Ced Neff) presented for chest pain.  He felt overnight while working on the computer chest pain crushing that radiates to his L arm for a period of time of 1 hours approximately and decided to come to the ED. He had associated lightheadedness and BP at home 208 SBP which is not normal. He reports his chest pain is similar to when he had CABG but is lighter. He did not have any sweating, palpitations at time of event.  He does not have any interval chest pain, AVINA, palpitations - He denies any history of arrythmia or CHF  He f/up with Dr. Neff and had a recent echo done not long ago - is compliant with his medications (does no remeber exactly which one he takes) ED complaining of left-sided chest tightness intermittently since last night with left arm tingling and some shortness of breath.  No cough, fever, chills, leg pain or leg swelling.     pt presented to the ED on June 9th but eloped.  pt had an Unstable angina and  Sinus bradycardia with 1st degree AV block and blocked PACs . he was seen by cardiology and EP on Friday and the plan was to do cardiac catheterization .  pt is admitted for cath today.  67 yo M with PMHx significant for HTN, HLD, DM, CAD s/p CABG (f/u with Dr. Ced Neff) presented for chest pain.  last Friday He felt overnight while working on the computer chest pain crushing that radiates to his L arm for a period of time of 1 hours approximately and decided to come to the ED. He had associated lightheadedness and BP at home 208 SBP which is not normal. He reports his chest pain is similar to when he had CABG but is lighter. He did not have any sweating, palpitations at time of event.  pt had an Unstable angina and  Sinus bradycardia with 1st degree AV block and blocked PACs . he was seen by cardiology and EP .  He does not have any interval chest pain, AVINA, palpitations - He denies any history of arrythmia or CHF  pt eloped last Friday and came back for cardiac cath today.      69 yo M with PMHx significant for HTN, HLD, DM, CAD s/p CABG (f/u with Dr. Ced Neff) presented for chest pain.  last Friday He felt overnight while working on the computer chest pain crushing that radiates to his L arm for a period of time of 1 hours approximately and decided to come to the ED. He had associated lightheadedness and BP at home 208 SBP which is not normal. He reports his chest pain is similar to when he had CABG but is lighter. He did not have any sweating, palpitations at time of event. he presented today for the same pain radiating to the left arm. no lightheadedness, no N/ V no pain, no fever no chills.   pt had an Unstable angina and  Sinus bradycardia with 1st degree AV block and blocked PACs . he was seen by cardiology and EP  last admission.   He does not have any interval chest pain, AVINA, palpitations - He denies any history of arrythmia or CHF.  pt eloped last Friday and came back for cardiac cath today. EP plan: cardiac cath today and Pacemaker tomorrow in cath is negative.   in the ED,pt's VS T(C): 36.8 (06-12-23 @ 10:36), Max: 36.8 (06-12-23 @ 10:36)  HR: 59 (06-12-23 @ 10:36) (29-59 )  BP: 140/67 (06-12-23 @ 10:36) (140/67 - 140/67)  RR: 16 (06-12-23 @ 10:36) (16 - 16)  SpO2: 98% (06-12-23 @ 10:36) (98% - 98%), labs done showed hb 14, trop <0.01.   TTE Echo Complete w/o Contrast w/ Doppler (06.09.23 @ 10:14) >   LV Ejection Fraction by Johnson's Method with a biplane EF of 62 %.  pt received aspirin 234 mg x1  pt is admitted for workup/management

## 2023-06-12 NOTE — ED PROVIDER NOTE - CLINICAL SUMMARY MEDICAL DECISION MAKING FREE TEXT BOX
Pt here with exertional concerning for stable angina. Supposed to have cardiac cath but left AMA, now he is amenable to staying for cardiac cath and is scheduled to go at 2pm today. Requires admission for cardiac cath given risk for MI, heart failure, cardiogenic shock if not closely monitored and evaluated.

## 2023-06-12 NOTE — ED PROVIDER NOTE - NS ED ROS FT
Constitutional: (-) fever  Eyes/ENT: (-) blurry vision, (-) epistaxis  Cardiovascular: (+) chest pain, (-) syncope  Respiratory: (-) cough, (+) shortness of breath  Gastrointestinal: (-) vomiting, (-) diarrhea  Musculoskeletal: (-) neck pain, (-) back pain, (-) joint pain  Integumentary: (-) rash, (-) edema  Neurological: (-) headache, (-) altered mental status

## 2023-06-12 NOTE — CONSULT NOTE ADULT - ASSESSMENT
69 yo M with PMHx significant for HTN, HLD, DM, CAD s/p CABG (f/u with Dr. Ced Neff) presented for chest pain.  Pt presented to the ED 2 days ago for CP.  It was recommended that he get a catheterization but pt eloped.  Pt returned to the ED today - he still has left sided CP/pressure and a heaviness in his left arm.  Pt with bradycardia to the 30's.  He reports AVINA    CP  Bradycardia (30s)  HTn  DM  CAD    Plan  Pt is going to go for cardiac cath today  Cont to monitor on Tele  Recommend PPM tomorrow but the pt would like to think about it  NPO after midnight

## 2023-06-12 NOTE — H&P ADULT - ATTENDING COMMENTS
Patient seen and examined at bedside independently of the residents. I read the resident's note and agree with the plan with the additions and corrections as noted below. My note supersedes the resident's note.     REVIEW OF SYSTEMS:  CONSTITUTIONAL: No weakness, fevers or chills  EYES/ENT: No visual changes;  No vertigo or throat pain   NECK: No pain or stiffness  RESPIRATORY: No cough, wheezing, hemoptysis; No shortness of breath  CARDIOVASCULAR: + chest pain.  GASTROINTESTINAL: No abdominal or epigastric pain. No nausea, vomiting, or hematemesis; No diarrhea or constipation. No melena or hematochezia.  GENITOURINARY: No dysuria, frequency or hematuria  NEUROLOGICAL: No numbness or weakness  MSK: No pain. No weakness.   SKIN: No itching, rashes.     PMH: HTN, HLD, DM II and CAD s/p CABG    FHx: Reviewed. No fhx of asthma/copd, No fhx of liver and pulmonary disease. No fhx of hematological disorder.     Physical Exam:  GEN: No acute distress. Awake, Alert and oriented x 3.   Head: Atraumatic, Normocephalic.   Eye: PEERLA. No sclera icterus. EOMI.   ENT: Normal oropharynx, no thyromegaly, no mass, no lymphadenopathy.   LUNGS: Clear to auscultation bilaterally. No wheeze/rales/crackles.   HEART: Normal. S1/S2 present. RRR. No murmur/gallops.   ABD: Soft, non-tender, non-distended. Bowel sounds present.   EXT: No pitting edema. No erythema. No tenderness.  Integumentary: No rash, No sore, No petechia.   NEURO: CN III-XII intact. Strength: 5/5 b/l ULE. Sensory intact b/l ULE.     Vital Signs Last 24 Hrs  T(C): 36.8 (2023 10:36), Max: 36.8 (2023 10:36)  T(F): 98.3 (2023 10:36), Max: 98.3 (2023 10:36)  HR: 48 (2023 14:48) (48 - 59)  BP: 147/70 (2023 14:48) (140/67 - 147/70)  BP(mean): --  RR: 18 (2023 14:48) (16 - 18)  SpO2: 99% (2023 14:48) (98% - 99%)    Parameters below as of 2023 14:48  Patient On (Oxygen Delivery Method): room air      Please see the above notes for Labs and radiology.     Assessment and Plan:     69 yo M with hx of HTN, HLD, DM II and CAD s/p CABG presents to ED for chest pain.     Chest pain (r/o ACS)  - EKG shows sinus marcelle with 1st degree HB. RBBB. Non specific T wave abnormality.   - Troponin neg x 1.   - TTE shows LVEF 62%.   - NPO for LHC today.   - NPO after MN for possible PPM placement tomorrow if LHC unremarkable. .   - c/w IVF gentle hydration  - f/u Cardiology.     Symptomatic bradycardia likely 2/2 Type AV block (Wenckebach)  - s/p eval by EP and recommends PPM placement tomorrow if LHC unremarkable.   - Hold BB for now.   - NPO after MN.   - monitor on Telemetry.     HTN/HLD - c/w home med. Hold ARB prior to cath.   DM II - monitor FS AC HS. Insulin regimen.     DVT ppx: Lovenox SC  GI ppx:  PPI   Diet: NPO   Activity: as tolerated.     Date seen by the attendin2023  Total time spent: 75 minutes.

## 2023-06-12 NOTE — ED PROVIDER NOTE - NSICDXPASTMEDICALHX_GEN_ALL_CORE_FT
PAST MEDICAL HISTORY:  Mild HTN      PAST MEDICAL HISTORY:  CAD (coronary artery disease)     HLD (hyperlipidemia)     Mild HTN

## 2023-06-12 NOTE — ED PROVIDER NOTE - OBJECTIVE STATEMENT
68-year-old male with history of CAD status post CABG, diabetes, hypertension, high cholesterol presents to the ED complaining of left-sided chest tightness intermittently since last night with left arm tingling and some shortness of breath.  No cough, fever, chills, leg pain or leg swelling.

## 2023-06-12 NOTE — ED ADULT TRIAGE NOTE - CHIEF COMPLAINT QUOTE
Patient presents to ED with complaints of intermittent L sided chest pain radiating to the L arm. CABGx4 last 20 yrs ago.

## 2023-06-12 NOTE — ED PROVIDER NOTE - ATTENDING APP SHARED VISIT CONTRIBUTION OF CARE
69 yo M with hx of HTN, HLD, CAD s/p CABG x4 who presents with chest tightness since last night associated with sob. Nonpleuritic. No fever leg swelling/pain, hx of clots, hormone use, recent immobilization/surg, hemoptysis, hx of cancer. Pt was admitted to hospital on 6/9 for cath lab but left AMA because was told cardiac cath would be done after the weekend and he says he [place] very close to the hospital so can return whenever. Yesterday sx returned so called PMD who said to come to ED. No FHx of CAD.    PMD Dr. Howard  Cardio Dr. Neff    CONSTITUTIONAL: well developed, nontoxic appearing, in no acute distress, speaking in full sentences  SKIN: warm, dry, no rash, cap refill < 2 seconds  HEENT: normocephalic, atraumatic, no conjunctival erythema, moist mucous membranes, patent airway  NECK: supple  CV:  regular rate, regular rhythm, 2+ radial pulses bilaterally  RESP: no wheezes, no rales, no rhonchi, normal work of breathing  ABD: soft, no tenderness, nondistended, no rebound, no guarding  MSK: normal ROM, no cyanosis, no edema, no calf tenderness  NEURO: alert, oriented, grossly unremarkable  PSYCH: cooperative, appropriate    Pt here with exertional concerning for stable angina. Supposed to have cardiac cath but left AMA, now he is amenable to staying for cardiac cath and is scheduled to go at 2pm today. Low suspicion for PE given no PE risk factors, no tachycardia, no hypoxemia, no tachypnea. Low suspicion for dissection given equal distal pulses, normotensive, no neuro deficits however will check for widened mediastinum on cxr. Low suspicion for esophageal perforation given no recent instrumentation, no vomiting/activities that increase intrathoracic pressure, no hammans crunch. Plan for labs, ekg, cxr r/o ACS, CHF, ptx, pneumonia, pleural effusion, pneumomediastinum.

## 2023-06-13 ENCOUNTER — TRANSCRIPTION ENCOUNTER (OUTPATIENT)
Age: 68
End: 2023-06-13

## 2023-06-13 VITALS
DIASTOLIC BLOOD PRESSURE: 57 MMHG | SYSTOLIC BLOOD PRESSURE: 122 MMHG | HEART RATE: 52 BPM | RESPIRATION RATE: 18 BRPM | TEMPERATURE: 98 F

## 2023-06-13 LAB
A1C WITH ESTIMATED AVERAGE GLUCOSE RESULT: 9 % — HIGH (ref 4–5.6)
ALBUMIN SERPL ELPH-MCNC: 3.6 G/DL — SIGNIFICANT CHANGE UP (ref 3.5–5.2)
ALP SERPL-CCNC: 57 U/L — SIGNIFICANT CHANGE UP (ref 30–115)
ALT FLD-CCNC: 25 U/L — SIGNIFICANT CHANGE UP (ref 0–41)
ANION GAP SERPL CALC-SCNC: 8 MMOL/L — SIGNIFICANT CHANGE UP (ref 7–14)
APTT BLD: 30.9 SEC — SIGNIFICANT CHANGE UP (ref 27–39.2)
AST SERPL-CCNC: 22 U/L — SIGNIFICANT CHANGE UP (ref 0–41)
BASOPHILS # BLD AUTO: 0.03 K/UL — SIGNIFICANT CHANGE UP (ref 0–0.2)
BASOPHILS NFR BLD AUTO: 0.4 % — SIGNIFICANT CHANGE UP (ref 0–1)
BILIRUB SERPL-MCNC: 0.5 MG/DL — SIGNIFICANT CHANGE UP (ref 0.2–1.2)
BUN SERPL-MCNC: 13 MG/DL — SIGNIFICANT CHANGE UP (ref 10–20)
CALCIUM SERPL-MCNC: 8.7 MG/DL — SIGNIFICANT CHANGE UP (ref 8.4–10.5)
CHLORIDE SERPL-SCNC: 104 MMOL/L — SIGNIFICANT CHANGE UP (ref 98–110)
CHOLEST SERPL-MCNC: 152 MG/DL — SIGNIFICANT CHANGE UP
CO2 SERPL-SCNC: 25 MMOL/L — SIGNIFICANT CHANGE UP (ref 17–32)
CREAT SERPL-MCNC: 0.6 MG/DL — LOW (ref 0.7–1.5)
EGFR: 105 ML/MIN/1.73M2 — SIGNIFICANT CHANGE UP
EOSINOPHIL # BLD AUTO: 0.48 K/UL — SIGNIFICANT CHANGE UP (ref 0–0.7)
EOSINOPHIL NFR BLD AUTO: 5.9 % — SIGNIFICANT CHANGE UP (ref 0–8)
ESTIMATED AVERAGE GLUCOSE: 212 MG/DL — HIGH (ref 68–114)
GLUCOSE BLDC GLUCOMTR-MCNC: 106 MG/DL — HIGH (ref 70–99)
GLUCOSE BLDC GLUCOMTR-MCNC: 108 MG/DL — HIGH (ref 70–99)
GLUCOSE BLDC GLUCOMTR-MCNC: 391 MG/DL — HIGH (ref 70–99)
GLUCOSE SERPL-MCNC: 110 MG/DL — HIGH (ref 70–99)
HCT VFR BLD CALC: 40 % — LOW (ref 42–52)
HCV AB S/CO SERPL IA: 0.04 COI — SIGNIFICANT CHANGE UP
HCV AB SERPL-IMP: SIGNIFICANT CHANGE UP
HDLC SERPL-MCNC: 38 MG/DL — LOW
HGB BLD-MCNC: 13.4 G/DL — LOW (ref 14–18)
IMM GRANULOCYTES NFR BLD AUTO: 0.2 % — SIGNIFICANT CHANGE UP (ref 0.1–0.3)
INR BLD: 1.06 RATIO — SIGNIFICANT CHANGE UP (ref 0.65–1.3)
LIPID PNL WITH DIRECT LDL SERPL: 88 MG/DL — SIGNIFICANT CHANGE UP
LYMPHOCYTES # BLD AUTO: 2.21 K/UL — SIGNIFICANT CHANGE UP (ref 1.2–3.4)
LYMPHOCYTES # BLD AUTO: 27.3 % — SIGNIFICANT CHANGE UP (ref 20.5–51.1)
MCHC RBC-ENTMCNC: 28.8 PG — SIGNIFICANT CHANGE UP (ref 27–31)
MCHC RBC-ENTMCNC: 33.5 G/DL — SIGNIFICANT CHANGE UP (ref 32–37)
MCV RBC AUTO: 85.8 FL — SIGNIFICANT CHANGE UP (ref 80–94)
MONOCYTES # BLD AUTO: 1.01 K/UL — HIGH (ref 0.1–0.6)
MONOCYTES NFR BLD AUTO: 12.5 % — HIGH (ref 1.7–9.3)
NEUTROPHILS # BLD AUTO: 4.36 K/UL — SIGNIFICANT CHANGE UP (ref 1.4–6.5)
NEUTROPHILS NFR BLD AUTO: 53.7 % — SIGNIFICANT CHANGE UP (ref 42.2–75.2)
NON HDL CHOLESTEROL: 114 MG/DL — SIGNIFICANT CHANGE UP
NRBC # BLD: 0 /100 WBCS — SIGNIFICANT CHANGE UP (ref 0–0)
PLATELET # BLD AUTO: 199 K/UL — SIGNIFICANT CHANGE UP (ref 130–400)
PMV BLD: 10.9 FL — HIGH (ref 7.4–10.4)
POTASSIUM SERPL-MCNC: 4 MMOL/L — SIGNIFICANT CHANGE UP (ref 3.5–5)
POTASSIUM SERPL-SCNC: 4 MMOL/L — SIGNIFICANT CHANGE UP (ref 3.5–5)
PROT SERPL-MCNC: 6 G/DL — SIGNIFICANT CHANGE UP (ref 6–8)
PROTHROM AB SERPL-ACNC: 12.1 SEC — SIGNIFICANT CHANGE UP (ref 9.95–12.87)
RBC # BLD: 4.66 M/UL — LOW (ref 4.7–6.1)
RBC # FLD: 13.4 % — SIGNIFICANT CHANGE UP (ref 11.5–14.5)
SODIUM SERPL-SCNC: 137 MMOL/L — SIGNIFICANT CHANGE UP (ref 135–146)
TRIGL SERPL-MCNC: 132 MG/DL — SIGNIFICANT CHANGE UP
WBC # BLD: 8.11 K/UL — SIGNIFICANT CHANGE UP (ref 4.8–10.8)
WBC # FLD AUTO: 8.11 K/UL — SIGNIFICANT CHANGE UP (ref 4.8–10.8)

## 2023-06-13 PROCEDURE — 93010 ELECTROCARDIOGRAM REPORT: CPT

## 2023-06-13 PROCEDURE — 99239 HOSP IP/OBS DSCHRG MGMT >30: CPT

## 2023-06-13 PROCEDURE — 99233 SBSQ HOSP IP/OBS HIGH 50: CPT

## 2023-06-13 RX ORDER — RANOLAZINE 500 MG/1
1 TABLET, FILM COATED, EXTENDED RELEASE ORAL
Qty: 60 | Refills: 0
Start: 2023-06-13 | End: 2023-07-12

## 2023-06-13 RX ORDER — ATORVASTATIN CALCIUM 80 MG/1
1 TABLET, FILM COATED ORAL
Qty: 30 | Refills: 0
Start: 2023-06-13 | End: 2023-07-12

## 2023-06-13 RX ORDER — ATROPINE SULFATE 0.1 MG/ML
0.5 SYRINGE (ML) INJECTION ONCE
Refills: 0 | Status: DISCONTINUED | OUTPATIENT
Start: 2023-06-13 | End: 2023-06-13

## 2023-06-13 RX ORDER — ROSUVASTATIN CALCIUM 5 MG/1
1 TABLET ORAL
Qty: 30 | Refills: 1
Start: 2023-06-13 | End: 2023-08-11

## 2023-06-13 RX ORDER — AMLODIPINE BESYLATE 2.5 MG/1
1 TABLET ORAL
Qty: 0 | Refills: 0 | DISCHARGE
Start: 2023-06-13

## 2023-06-13 RX ORDER — AMLODIPINE BESYLATE 2.5 MG/1
1 TABLET ORAL
Refills: 0 | DISCHARGE

## 2023-06-13 RX ORDER — HYDRALAZINE HCL 50 MG
1 TABLET ORAL
Refills: 0 | DISCHARGE

## 2023-06-13 RX ORDER — HYDRALAZINE HCL 50 MG
1 TABLET ORAL
Qty: 90 | Refills: 0
Start: 2023-06-13 | End: 2023-07-12

## 2023-06-13 RX ORDER — ASPIRIN/CALCIUM CARB/MAGNESIUM 324 MG
1 TABLET ORAL
Qty: 90 | Refills: 0
Start: 2023-06-13 | End: 2023-09-10

## 2023-06-13 RX ORDER — CLOPIDOGREL BISULFATE 75 MG/1
1 TABLET, FILM COATED ORAL
Qty: 90 | Refills: 0
Start: 2023-06-13 | End: 2023-09-10

## 2023-06-13 RX ORDER — ATROPINE SULFATE 0.1 MG/ML
0.5 SYRINGE (ML) INJECTION ONCE
Refills: 0 | Status: COMPLETED | OUTPATIENT
Start: 2023-06-13 | End: 2023-06-13

## 2023-06-13 RX ADMIN — AMLODIPINE BESYLATE 10 MILLIGRAM(S): 2.5 TABLET ORAL at 05:46

## 2023-06-13 RX ADMIN — LISINOPRIL 20 MILLIGRAM(S): 2.5 TABLET ORAL at 05:46

## 2023-06-13 RX ADMIN — RANOLAZINE 500 MILLIGRAM(S): 500 TABLET, FILM COATED, EXTENDED RELEASE ORAL at 05:45

## 2023-06-13 RX ADMIN — Medication 81 MILLIGRAM(S): at 11:29

## 2023-06-13 RX ADMIN — Medication 25 MILLIGRAM(S): at 05:45

## 2023-06-13 RX ADMIN — CLOPIDOGREL BISULFATE 75 MILLIGRAM(S): 75 TABLET, FILM COATED ORAL at 11:29

## 2023-06-13 RX ADMIN — Medication 10: at 11:52

## 2023-06-13 NOTE — DISCHARGE NOTE PROVIDER - PROVIDER TOKENS
PROVIDER:[TOKEN:[[id number]],FOLLOWUP:[2 weeks]],PROVIDER:[TOKEN:[[id number]],FOLLOWUP:[2 weeks]] PROVIDER:[TOKEN:[[id number]],FOLLOWUP:[2 weeks]],PROVIDER:[TOKEN:[[id number]],FOLLOWUP:[2 weeks]],PROVIDER:[TOKEN:[[id number]],FOLLOWUP:[2 weeks]]

## 2023-06-13 NOTE — PATIENT PROFILE ADULT - HOME ACCESSIBILITY CONCERNS
"I tested positive for covid 3 days ago and I feel like my lips look more blue. I don't feel short of breathe. im having belly pain for a week. im eating and drinking ok" none

## 2023-06-13 NOTE — PROGRESS NOTE ADULT - ASSESSMENT
67 yo M with PMHx significant for HTN, HLD, DM, CAD s/p CABG (f/u with Dr. Ced Neff) presented for chest pain.  Pt presented to the ED 2 days ago for CP.  It was recommended that he get a catheterization but pt eloped.  Pt returned to the ED today - he still has left sided CP/pressure and a heaviness in his left arm.  Pt with bradycardia to the 30's.  He reports AVINA    Impression:  CP  Bradycardia (30s)  HTn  DM  CAD    Plan:  - Cath report reviewed  - Recommended PPM and discussed risks/benefits with patient and family however at this time they are refusing PPM  - No MCOT needed at this time  - Patient will follow up with Dr Andrade in 2 weeks (office will call)  - Recall EP as needed [id number]

## 2023-06-13 NOTE — DISCHARGE NOTE NURSING/CASE MANAGEMENT/SOCIAL WORK - NSDCPEFALRISK_GEN_ALL_CORE
For information on Fall & Injury Prevention, visit: [email or web address] OR  [email or web address] OR  https://www.cdc.gov/steadi/patient.html

## 2023-06-13 NOTE — DISCHARGE NOTE NURSING/CASE MANAGEMENT/SOCIAL WORK - PATIENT PORTAL LINK FT
You can access the FollowMyHealth Patient Portal offered by [facility] by registering at the following website: [email or web address]. By joining [employer]’s FollowMyHealth portal, you will also be able to view your health information using other applications (apps) compatible with our system.

## 2023-06-13 NOTE — DISCHARGE NOTE PROVIDER - NSDCCPCAREPLAN_GEN_ALL_CORE_FT
PRINCIPAL DISCHARGE DIAGNOSIS  Diagnosis: ACS (acute coronary syndrome)  Assessment and Plan of Treatment: You have been diagnosed with acute coronary syndrome. This condition develops when part of the heart muscle doesn't get enough oxygen.  You underwent left heart cath that showed severe disease in you coronaries. A stent was placed in the the left circumflex coronary artery.  Follow the below lifestyle change recommendations :   - Avoid smoking and do not let anyone smoke in your house.   - Avoid fatty food and stay away from fast food restaurant.   - Take your blood pressure medication as prescribed.   - Take your blood thinners and cholesterol medication as prescribed. Do not run out on your medication and always contact your doctor if you need refills   - Call 911 or report to the emergency department if you have chest pain or pressure sensation in your chest, especially if the pain goes to your neck, jaw, back or arms or it is accompanied by sweating, nausea and vomiting    - Contact your doctor if you have worsening shortness of breath, during activity or at rest, or if you feel dizzy, if you feel that your hertbeat is very fast very slow or not steady   - Any changes in your chest pain may mean that your disease is getting worse : These changes include : chest pain that is getting stronger, that is occuring more frequently, that lasts longer, that occurs when you are not active or medicines do not improve your symptoms like they used to

## 2023-06-13 NOTE — DISCHARGE NOTE PROVIDER - CARE PROVIDER_API CALL
Ced Neff.  Cardiology  [address] [place] [address]  Phone: [phone]  Fax: [phone]  Follow Up Time: 2 weeks    Amberly Kent  Internal Medicine  [address] [place] [address]  Phone: [phone]  Fax: [phone]  Follow Up Time: 2 weeks   Ced Neff.  Cardiology  [address] [place] [address]  Phone: [phone]  Fax: [phone]  Follow Up Time: 2 weeks    Amberly Kent  Internal Medicine  [address] [place] [address]  Phone: [phone]  Fax: [phone]  Follow Up Time: 2 weeks    Mat Andrade  Cardiac Electrophysiology  [address] [place] [address]  Phone: [phone]  Fax: [phone]  Follow Up Time: 2 weeks

## 2023-06-13 NOTE — DISCHARGE NOTE PROVIDER - HOSPITAL COURSE
HPI:  67 yo M with PMHx significant for HTN, HLD, DM, CAD s/p CABG (f/u with Dr. Ced Neff) presented for chest pain. He felt crushing chest pain at rest that radiates to his L arm for a period of time of 1 hours approximately and decided to come to the ED. He had associated lightheadedness and BP at home 208 SBP which is not normal. He reports his chest pain is similar to when he had CABG but is lighter.     ECG showed sinus bradycardia with RBBB and 1st degree AV block  Troponin -ve   TTE showed LV Ejection Fraction by Johnson's Method with a biplane EF of 62 %.    Patient underwent cardiac cath that showed:  LM: moderate disease  LAD: 100%  distal vessel has severe diffuse disease supplied by patent LIMA to LAD  D1 severe diffuse disease  CX: ostial Cx 95% steonsis s/p intervention. 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   Patient's anti-HTN meds were adjusted and BP is currently controlled with hydralazine, amlodipine, and lisinopril.  EP evaluated the patient for bradycardia and recommended MCOT prior to discharge (which he got) with outpt follow up.  Patient is medically stable and ready for discharge.       HPI:  69 yo M with PMHx significant for HTN, HLD, DM, CAD s/p CABG (f/u with Dr. Ced Neff) presented for chest pain. He felt crushing chest pain at rest that radiates to his L arm for a period of time of 1 hours approximately and decided to come to the ED. He had associated lightheadedness and BP at home 208 SBP which is not normal. He reports his chest pain is similar to when he had CABG but is lighter.     ECG showed sinus bradycardia with RBBB and 1st degree AV block  Troponin -ve   TTE showed LV Ejection Fraction by Johnson's Method with a biplane EF of 62 %.    Patient underwent cardiac cath that showed:  LM: moderate disease  LAD: 100%  distal vessel has severe diffuse disease supplied by patent LIMA to LAD  D1 severe diffuse disease  CX: ostial Cx 95% steonsis s/p intervention. 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   Patient's anti-HTN meds were adjusted and BP is currently controlled with hydralazine, amlodipine, and lisinopril.  EP evaluated the patient for severe symptomatic bradycardia and recommended PPM however the patient refused and will follow up as outpatient in 2 weeks.  Patient is medically stable and ready for discharge.       HPI:  67 yo M with PMHx significant for HTN, HLD, DM, CAD s/p CABG (f/u with Dr. Ced Neff) presented for chest pain. He felt crushing chest pain at rest that radiates to his L arm for a period of time of 1 hours approximately and decided to come to the ED. He had associated lightheadedness and BP at home 208 SBP which is not normal. He reports his chest pain is similar to when he had CABG but is lighter.     ECG showed sinus bradycardia with RBBB and 1st degree AV block  Troponin -ve   TTE showed LV Ejection Fraction by Johnson's Method with a biplane EF of 62 %.    Patient underwent cardiac cath that showed:  LM: moderate disease  LAD: 100%  distal vessel has severe diffuse disease supplied by patent LIMA to LAD  D1 severe diffuse disease  CX: ostial Cx 95% steonsis s/p intervention. 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   Patient's anti-HTN meds were adjusted and BP is currently controlled with hydralazine, amlodipine, and lisinopril.  EP evaluated the patient for severe symptomatic bradycardia and recommended PPM however the patient refused and will follow up as outpatient in 2 weeks.  Patient is medically stable and ready for discharge.    A/P:   Unstable Angina:   CAD:   Troponin x3 negative.   EKG showed RBBB, no acute ST or T changes.   Cardiac cath as above. s/p PCI for Lcx. Outpatient follow up with his cardiologist outpatient for staged PCI.     Bradycardia  Episode of 2nd degree av block Mobitz I on telemetry.   EP recommended pacemaker however patient wants to think about it and follow up outpatient.   Avoid AVN blocking agent like BB and CCB.     DM type 2: continue home meds  HTN: continue ACEi, Amlodipine 10mg daily and Hydralazine 25mg TID.

## 2023-06-13 NOTE — DISCHARGE NOTE PROVIDER - NSDCMRMEDTOKEN_GEN_ALL_CORE_FT
aspirin 81 mg oral delayed release tablet: 1 tab(s) orally once a day  atorvastatin 80 mg oral tablet: 1 tab(s) orally once a day (at bedtime)  clopidogrel 75 mg oral tablet: 1 tab(s) orally once a day  glipiZIDE 10 mg oral tablet: 1 orally 2 times a day  hydrALAZINE 25 mg oral tablet: 1 tab(s) orally 3 times a day  Jardiance 10 mg oral tablet: 1 orally once a day  metFORMIN 850 mg oral tablet: 1 orally 2 times a day  olmesartan 40 mg oral tablet: 1 orally once a day  ranolazine 500 mg oral tablet, extended release: 1 tab(s) orally 2 times a day   aspirin 81 mg oral delayed release tablet: 1 tab(s) orally once a day  clopidogrel 75 mg oral tablet: 1 tab(s) orally once a day  Crestor 40 mg oral tablet: 1 tab(s) orally once a day (at bedtime)  glipiZIDE 10 mg oral tablet: 1 orally 2 times a day  hydrALAZINE 25 mg oral tablet: 1 tab(s) orally 3 times a day  Jardiance 10 mg oral tablet: 1 orally once a day  metFORMIN 850 mg oral tablet: 1 orally 2 times a day  Norvasc 10 mg oral tablet: 1 tab(s) orally once a day  olmesartan 40 mg oral tablet: 1 orally once a day  ranolazine 500 mg oral tablet, extended release: 1 tab(s) orally 2 times a day

## 2023-06-13 NOTE — PROGRESS NOTE ADULT - SUBJECTIVE AND OBJECTIVE BOX
INTERVAL HPI/OVERNIGHT EVENTS:  Patient still with episodes of bradycardia, Mobitz I  HD and feeling well currently    MEDICATIONS  (STANDING):  amLODIPine   Tablet 10 milliGRAM(s) Oral daily  aspirin enteric coated 81 milliGRAM(s) Oral daily  atorvastatin 80 milliGRAM(s) Oral at bedtime  clopidogrel Tablet 75 milliGRAM(s) Oral daily  dextrose 5%. 1000 milliLiter(s) (50 mL/Hr) IV Continuous <Continuous>  dextrose 50% Injectable 25 Gram(s) IV Push once  glucagon  Injectable 1 milliGRAM(s) IntraMuscular once  hydrALAZINE 25 milliGRAM(s) Oral three times a day  insulin glargine Injectable (LANTUS) 10 Unit(s) SubCutaneous at bedtime  insulin lispro (ADMELOG) corrective regimen sliding scale   SubCutaneous three times a day before meals  lisinopril 20 milliGRAM(s) Oral daily  ranolazine 500 milliGRAM(s) Oral two times a day  sodium chloride 0.9%. 1000 milliLiter(s) (100 mL/Hr) IV Continuous <Continuous>    MEDICATIONS  (PRN):  acetaminophen     Tablet .. 650 milliGRAM(s) Oral every 6 hours PRN Temp greater or equal to 38C (100.4F), Mild Pain (1 - 3)  aluminum hydroxide/magnesium hydroxide/simethicone Suspension 30 milliLiter(s) Oral every 4 hours PRN Dyspepsia  dextrose Oral Gel 15 Gram(s) Oral once PRN Blood Glucose LESS THAN 70 milliGRAM(s)/deciliter  melatonin 3 milliGRAM(s) Oral at bedtime PRN Insomnia  ondansetron Injectable 4 milliGRAM(s) IV Push every 8 hours PRN Nausea and/or Vomiting      Allergies    No Known Allergies    Intolerances        REVIEW OF SYSTEMS: No CP, palpitations, dizziness or SOB    Vital Signs Last 24 Hrs  T(C): 36.6 (13 Jun 2023 05:06), Max: 36.6 (13 Jun 2023 05:06)  T(F): 97.8 (13 Jun 2023 05:06), Max: 97.8 (13 Jun 2023 05:06)  HR: 56 (13 Jun 2023 05:06) (48 - 56)  BP: 140/63 (13 Jun 2023 05:06) (140/63 - 176/78)  BP(mean): 112 (12 Jun 2023 22:31) (112 - 112)  RR: 18 (13 Jun 2023 05:06) (18 - 18)  SpO2: 99% (12 Jun 2023 14:48) (99% - 99%)    Parameters below as of 12 Jun 2023 14:48  Patient On (Oxygen Delivery Method): room air        06-12-23 @ 07:01  -  06-13-23 @ 07:00  --------------------------------------------------------  IN: 1150 mL / OUT: 1050 mL / NET: 100 mL        Physical Exam  GENERAL: In no apparent distress, well nourished, and hydrated.  EYES: EOMI, PERRLA, conjunctiva and sclera clear  NECK: Supple  HEART: Regular rate and rhythm; No murmurs, rubs, or gallops.  PULMONARY: Clear to auscultation and perfusion.  No rales, wheezing, or rhonchi bilaterally.  EXTREMITIES:  2+ Peripheral Pulses, No clubbing, cyanosis, or edema  NEUROLOGICAL: Grossly nonfocal    LABS:                        13.4   8.11  )-----------( 199      ( 13 Jun 2023 06:35 )             40.0     06-13    137  |  104  |  13  ----------------------------<  110<H>  4.0   |  25  |  0.6<L>    Ca    8.7      13 Jun 2023 06:35  Mg     2.2     06-12    TPro  6.0  /  Alb  3.6  /  TBili  0.5  /  DBili  x   /  AST  22  /  ALT  25  /  AlkPhos  57  06-13    PT/INR - ( 13 Jun 2023 06:35 )   PT: 12.10 sec;   INR: 1.06 ratio         PTT - ( 13 Jun 2023 06:35 )  PTT:30.9 sec      RADIOLOGY & ADDITIONAL TESTS:  
Cardiology Follow up s/p PCI DAR GIMENEZ   68y Male  PAST MEDICAL & SURGICAL HISTORY:    Mild HTN      HLD (hyperlipidemia)      CAD (coronary artery disease)      S/P CABG x 4           HPI:  69 yo M with PMHx significant for HTN, HLD, DM, CAD s/p CABG (f/u with Dr. Ced Neff) presented for chest pain.  last Friday He felt overnight while working on the computer chest pain crushing that radiates to his L arm for a period of time of 1 hours approximately and decided to come to the ED. He had associated lightheadedness and BP at home 208 SBP which is not normal. He reports his chest pain is similar to when he had CABG but is lighter. He did not have any sweating, palpitations at time of event. he presented today for the same pain radiating to the left arm. no lightheadedness, no N/ V no pain, no fever no chills.   pt had an Unstable angina and  Sinus bradycardia with 1st degree AV block and blocked PACs . he was seen by cardiology and EP  last admission.   He does not have any interval chest pain, AVINA, palpitations - He denies any history of arrythmia or CHF.  pt eloped last Friday and came back for cardiac cath today. EP plan: cardiac cath today and Pacemaker tomorrow in cath is negative.   in the ED,pt's VS T(C): 36.8 (06-12-23 @ 10:36), Max: 36.8 (06-12-23 @ 10:36)  HR: 59 (06-12-23 @ 10:36) (29-59 )  BP: 140/67 (06-12-23 @ 10:36) (140/67 - 140/67)  RR: 16 (06-12-23 @ 10:36) (16 - 16)  SpO2: 98% (06-12-23 @ 10:36) (98% - 98%), labs done showed hb 14, trop <0.01.   TTE Echo Complete w/o Contrast w/ Doppler (06.09.23 @ 10:14) >   LV Ejection Fraction by Johnson's Method with a biplane EF of 62 %.  pt received aspirin 234 mg x1  pt is admitted for workup/management       (12 Jun 2023 13:36)    Allergies    No Known Allergies    Intolerances      Patient seen and examined at bedside. No acute events overnight.  Patient without complaints. Pt ambulated without issues/symptoms  Denies CP, SOB, palpitations, or dizziness  SB, SR with 1st degree AV block noted on telemetry overnight    Vital Signs Last 24 Hrs  T(C): 36.6 (13 Jun 2023 05:06), Max: 36.8 (12 Jun 2023 10:36)  T(F): 97.8 (13 Jun 2023 05:06), Max: 98.3 (12 Jun 2023 10:36)  HR: 56 (13 Jun 2023 05:06) (48 - 59)  BP: 140/63 (13 Jun 2023 05:06) (140/63 - 176/78)  BP(mean): 112 (12 Jun 2023 22:31) (112 - 112)  RR: 18 (13 Jun 2023 05:06) (16 - 18)  SpO2: 99% (12 Jun 2023 14:48) (98% - 99%)    Parameters below as of 12 Jun 2023 14:48  Patient On (Oxygen Delivery Method): room air    MEDICATIONS  (STANDING):  amLODIPine   Tablet 10 milliGRAM(s) Oral daily  aspirin enteric coated 81 milliGRAM(s) Oral daily  atorvastatin 80 milliGRAM(s) Oral at bedtime  clopidogrel Tablet 75 milliGRAM(s) Oral daily  dextrose 5%. 1000 milliLiter(s) (50 mL/Hr) IV Continuous <Continuous>  dextrose 50% Injectable 25 Gram(s) IV Push once  glucagon  Injectable 1 milliGRAM(s) IntraMuscular once  hydrALAZINE 25 milliGRAM(s) Oral three times a day  insulin glargine Injectable (LANTUS) 10 Unit(s) SubCutaneous at bedtime  insulin lispro (ADMELOG) corrective regimen sliding scale   SubCutaneous three times a day before meals  lisinopril 20 milliGRAM(s) Oral daily  ranolazine 500 milliGRAM(s) Oral two times a day  sodium chloride 0.9%. 1000 milliLiter(s) (100 mL/Hr) IV Continuous <Continuous>    MEDICATIONS  (PRN):  acetaminophen     Tablet .. 650 milliGRAM(s) Oral every 6 hours PRN Temp greater or equal to 38C (100.4F), Mild Pain (1 - 3)  aluminum hydroxide/magnesium hydroxide/simethicone Suspension 30 milliLiter(s) Oral every 4 hours PRN Dyspepsia  dextrose Oral Gel 15 Gram(s) Oral once PRN Blood Glucose LESS THAN 70 milliGRAM(s)/deciliter  melatonin 3 milliGRAM(s) Oral at bedtime PRN Insomnia  ondansetron Injectable 4 milliGRAM(s) IV Push every 8 hours PRN Nausea and/or Vomiting      REVIEW OF SYSTEMS:          All negative except as mentioned in HPI    PHYSICAL EXAM:           CONSTITUTIONAL: Well-developed; well-nourished; in no acute distress  	SKIN: warm, dry  	HEAD: Normocephalic; atraumatic  	EYES: PERRL.  	ENT: No nasal discharge, airway clear, mucous membranes moist  	NECK: Supple; non tender.  	CARD: +S1, +S2, no murmurs, gallops, or rubs. Regular rate and rhythm    	RESP: No wheezes, rales or rhonchi. CTA B/L  	ABD: soft ntnd, + BS x 4 quadrants  	EXT: moves all extremities,  no clubbing, cyanosis or edema  	NEURO: Alert and oriented x3, no focal deficits          PSYCH: Cooperative, appropriate          VASCULAR:  + Rad / + PTs / +  DPs          EXTREMITY:              Right Groin: dressing removed, access site soft, no hematoma, no pain, + pulses, no sign of infection, no numbness      6/13/2023	  ECG:          SR with 1st degree AV block   RBBB  hr 63 bpm  QT/QTc 470/480        ECG:   < from: 12 Lead ECG (06.12.23 @ 10:42) >    Ventricular Rate 51 BPM    Atrial Rate 51 BPM    P-R Interval 352 ms    QRS Duration 146 ms    Q-T Interval 490 ms    QTC Calculation(Bazett) 451 ms    P Axis 65 degrees    R Axis 11 degrees    T Axis -21 degrees    Diagnosis Line Sinus bradycardia with 1st degree A-V block  Right bundle branch block  Nonspecific T wave abnormality  Abnormal ECG    Confirmed by Ortiz Kramer ([id number]) on 6/12/2023 5:28:01 PM                                                                                                                2D ECHO:  < from: TTE Echo Complete w/o Contrast w/ Doppler (06.09.23 @ 10:14) >  Summary:   1. Normal global left ventricular systolic function.   2. LV Ejection Fraction by Johnson's Method with a biplane EF of 62 %.   3. Normal left ventricular internal cavity size.   4. Mildly enlarged left atrium.   5. Normal right atrial size.   6. There is no evidence of pericardial effusion.   7. Mild mitral annular calcification.   8. Mild mitral valve regurgitation.   9. Mild thickening and calcification of the anterior and posterior   mitral valve leaflets.  10. Mild-moderate tricuspid regurgitation.    LABS:                        13.4   8.11  )-----------( 199      ( 13 Jun 2023 06:35 )             40.0     06-13    137  |  104  |  13  ----------------------------<  110<H>  4.0   |  25  |  0.6<L>    Ca    8.7      13 Jun 2023 06:35  Mg     2.2     06-12    TPro  6.0  /  Alb  3.6  /  TBili  0.5  /  DBili  x   /  AST  22  /  ALT  25  /  AlkPhos  57  06-13    CARDIAC MARKERS ( 12 Jun 2023 12:04 )  x     / <0.01 ng/mL / x     / x     / x        Magnesium, Serum: 2.2 mg/dL [1.8 - 2.4] (06-12-23 @ 12:04)  LIVER FUNCTIONS - ( 13 Jun 2023 06:35 )  Alb: 3.6 g/dL / Pro: 6.0 g/dL / ALK PHOS: 57 U/L / ALT: 25 U/L / AST: 22 U/L / GGT: x           LDL Cholesterol Calculated: 88 mg/dL (06.13.23 @ 06:35)     A/P:  I discussed the case with Cardiologist Dr. Neff and recommend the following:  S/P PCI:  Access & Closure:     [x] 6 Fr Right Femoral Artery (Angioseal closure)    IV Contrast: 160 mL      Intervention: successful IVUS guided balloon angioplasty shockwave lithotripsy and JEAN X 1 of LM into Cx stenosis    Implants: Megatron 3.5 X 28 LM into Cx    FINDINGS:     Coronary Dominance: Co dominant    LM: moderate disease    LAD: 100%  distal vessel has severe diffuse disease supplied by patent LIMA to LAD  D1 severe diffuse disease    CX: ostial Cx 95% steonsis s/p intervention. 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     LIMA: patent LIMA to LAD    SVG: Occluded    LVEDP: 12 mmHg                        Ambulate patient around the unit                    No B-Blocker due to bradycardia                    EP c/s - done, PPM as OP in 2-3 weeks   	     Continue DAPT ( Aspirin 81 mg daily and Plavix 75 mg daily ), ACEi, CCB, Ranexa, Hydralazine, Statin Therapy                   Patient given 30 day supply of ( Aspirin 81 mg daily and Plavix 75 mg daily ) to take at home                   Patient agreeing to take DAPT for at least one year or as directed by cardiologist                    Pt given instructions on importance of taking antiplatelet medication or risk acute stent thrombosis/death                   Post cath instructions, access site care and activity restrictions reviewed with patient                     Discussed with patient to return to hospital if experience chest pain, shortness breath, dizziness and site bleeding                   Aggressive risk factor modification, diet counseling, smoking cessation discussed with patient                       Can discharge patient from interventional cardiac standpoint after ambulating without symptoms and access site wnl, ECG and blood work reviewed                    Benefits of Cardiac Rehab discussed with patient, All documents sent to [facility]. Patient instructed to call and make first                               appointment after first f/u visit with Cardiologist                    Follow up with Cardiology Dr. Neff in two weeks. Instructed to call and make an appointment

## 2023-06-13 NOTE — PROGRESS NOTE ADULT - NS ATTEND AMEND GEN_ALL_CORE FT
Patient will need PPM because of advance conduction disease and need for BB in view of advanced CAD/LM disease.   He is and his son are reluctant.  OP f-up in 1-2 weeks for PPM placement.

## 2023-06-14 PROBLEM — I25.10 ATHEROSCLEROTIC HEART DISEASE OF NATIVE CORONARY ARTERY WITHOUT ANGINA PECTORIS: Chronic | Status: ACTIVE | Noted: 2023-06-12

## 2023-06-15 DIAGNOSIS — E11.9 TYPE 2 DIABETES MELLITUS WITHOUT COMPLICATIONS: ICD-10-CM

## 2023-06-15 DIAGNOSIS — E78.5 HYPERLIPIDEMIA, UNSPECIFIED: ICD-10-CM

## 2023-06-15 DIAGNOSIS — I25.110 ATHEROSCLEROTIC HEART DISEASE OF NATIVE CORONARY ARTERY WITH UNSTABLE ANGINA PECTORIS: ICD-10-CM

## 2023-06-15 DIAGNOSIS — I44.0 ATRIOVENTRICULAR BLOCK, FIRST DEGREE: ICD-10-CM

## 2023-06-15 DIAGNOSIS — I49.1 ATRIAL PREMATURE DEPOLARIZATION: ICD-10-CM

## 2023-06-15 DIAGNOSIS — I10 ESSENTIAL (PRIMARY) HYPERTENSION: ICD-10-CM

## 2023-06-15 DIAGNOSIS — Z95.1 PRESENCE OF AORTOCORONARY BYPASS GRAFT: ICD-10-CM

## 2023-06-15 DIAGNOSIS — I45.10 UNSPECIFIED RIGHT BUNDLE-BRANCH BLOCK: ICD-10-CM

## 2023-06-15 DIAGNOSIS — R00.1 BRADYCARDIA, UNSPECIFIED: ICD-10-CM

## 2023-06-15 DIAGNOSIS — I44.1 ATRIOVENTRICULAR BLOCK, SECOND DEGREE: ICD-10-CM

## 2023-06-15 DIAGNOSIS — R07.9 CHEST PAIN, UNSPECIFIED: ICD-10-CM

## 2023-06-16 DIAGNOSIS — E78.5 HYPERLIPIDEMIA, UNSPECIFIED: ICD-10-CM

## 2023-06-16 DIAGNOSIS — I10 ESSENTIAL (PRIMARY) HYPERTENSION: ICD-10-CM

## 2023-06-16 DIAGNOSIS — Z79.02 LONG TERM (CURRENT) USE OF ANTITHROMBOTICS/ANTIPLATELETS: ICD-10-CM

## 2023-06-16 DIAGNOSIS — R00.1 BRADYCARDIA, UNSPECIFIED: ICD-10-CM

## 2023-06-16 DIAGNOSIS — I25.110 ATHEROSCLEROTIC HEART DISEASE OF NATIVE CORONARY ARTERY WITH UNSTABLE ANGINA PECTORIS: ICD-10-CM

## 2023-06-16 DIAGNOSIS — E11.9 TYPE 2 DIABETES MELLITUS WITHOUT COMPLICATIONS: ICD-10-CM

## 2023-06-16 DIAGNOSIS — T44.7X5A ADVERSE EFFECT OF BETA-ADRENORECEPTOR ANTAGONISTS, INITIAL ENCOUNTER: ICD-10-CM

## 2023-06-16 DIAGNOSIS — Z87.891 PERSONAL HISTORY OF NICOTINE DEPENDENCE: ICD-10-CM

## 2023-06-16 DIAGNOSIS — Z95.1 PRESENCE OF AORTOCORONARY BYPASS GRAFT: ICD-10-CM

## 2023-06-16 DIAGNOSIS — I25.82 CHRONIC TOTAL OCCLUSION OF CORONARY ARTERY: ICD-10-CM

## 2023-06-16 DIAGNOSIS — Z79.84 LONG TERM (CURRENT) USE OF ORAL HYPOGLYCEMIC DRUGS: ICD-10-CM

## 2023-06-16 DIAGNOSIS — I44.1 ATRIOVENTRICULAR BLOCK, SECOND DEGREE: ICD-10-CM

## 2023-06-29 ENCOUNTER — APPOINTMENT (OUTPATIENT)
Dept: ELECTROPHYSIOLOGY | Facility: CLINIC | Age: 68
End: 2023-06-29

## 2023-07-14 PROBLEM — E78.5 HYPERLIPIDEMIA, UNSPECIFIED: Chronic | Status: ACTIVE | Noted: 2023-06-12

## 2023-07-14 PROBLEM — I10 ESSENTIAL (PRIMARY) HYPERTENSION: Chronic | Status: ACTIVE | Noted: 2021-12-22
